# Patient Record
Sex: MALE | Race: NATIVE HAWAIIAN OR OTHER PACIFIC ISLANDER | URBAN - METROPOLITAN AREA
[De-identification: names, ages, dates, MRNs, and addresses within clinical notes are randomized per-mention and may not be internally consistent; named-entity substitution may affect disease eponyms.]

---

## 2024-09-03 NOTE — PROGRESS NOTES
General: Skin is warm.   Neurological:      General: No focal deficit present.   Psychiatric:         Mood and Affect: Mood normal.          I have reviewed all laboratory studies, reports, data, and pertinent images.    12/19/2018 [facility] MRI lumbar  Degenerative changes, notable for severe spinal canal narrowing at L2-3 and L3-4, multilevel high-grade subarticular zone encroachment, and multilevel foraminal stenosis.  Foraminal narrowing on the right is worst at L4-5 and foraminal narrowing on the left is worst at L5-S1.     7/9/2021 MRI cervical spine [facility]  Multilevel degenerative changes with resulting spinal canal narrowing   most pronounced and mild to moderate in severity at C3-C4 eccentric to   the right and at C5-C6.  Foraminal narrowing most pronounced and severe bilaterally at C5-C6.     9/12/2018 x-ray lumbar spine [facility]  Degenerative disc disease, degenerative facet changes and mild scoliosis     10/2/2018 EMG [facility]  Left median neuropathy mild  left ulnar neuropathy moderate  Left L5 mild degree changes  Possible polyneuropathy or S1 root  Not consistent with a generalized large fiber sensorimotor polyneuropathy    HISTORY OF PRESENT ILLNESS:    Dr. [facility], Dr. Crenshaw orthopedic surgery,  Progressive history gait deterioration unable to walk into a store without using a grocery cart.  Cannot go anywhere or do anything because his gait is so bad and balance issues are increasing over these last several months to years.  He is afraid he may fall.  If he is sitting or resting he has no pain.  Per his activity even walking 50 steps the pain gets so severe in his low back rating down both lower extremities left greater than right he has to stop and rest before going further.    10-12 years ago had a neck injury and since then is unable to turn his head to the right as far as he can to the left.  His arms are okay.  At night he gets numbness in the left upper extremity but not during the day.  Not a

## 2024-09-05 ENCOUNTER — OFFICE VISIT (OUTPATIENT)
Age: 79
End: 2024-09-05
Payer: MEDICARE

## 2024-09-05 ENCOUNTER — HOSPITAL ENCOUNTER (OUTPATIENT)
Dept: GENERAL RADIOLOGY | Age: 79
Discharge: HOME | End: 2024-09-07
Attending: NEUROLOGICAL SURGERY
Payer: MEDICARE

## 2024-09-05 VITALS
DIASTOLIC BLOOD PRESSURE: 74 MMHG | HEIGHT: 67 IN | WEIGHT: 215 LBS | SYSTOLIC BLOOD PRESSURE: 116 MMHG | BODY MASS INDEX: 33.74 KG/M2

## 2024-09-05 DIAGNOSIS — M48.062 LUMBAR STENOSIS WITH NEUROGENIC CLAUDICATION: ICD-10-CM

## 2024-09-05 DIAGNOSIS — M47.812 CERVICAL SPONDYLOSIS: Primary | ICD-10-CM

## 2024-09-05 PROCEDURE — 99204 OFFICE O/P NEW MOD 45 MIN: CPT | Performed by: NEUROLOGICAL SURGERY

## 2024-09-05 PROCEDURE — 72100 X-RAY EXAM L-S SPINE 2/3 VWS: CPT

## 2024-09-05 PROCEDURE — 1123F ACP DISCUSS/DSCN MKR DOCD: CPT | Performed by: NEUROLOGICAL SURGERY

## 2024-09-05 RX ORDER — ROSUVASTATIN CALCIUM 20 MG/1
20 TABLET, COATED ORAL DAILY
COMMUNITY
Start: 2024-05-03

## 2024-09-05 RX ORDER — MULTIVIT-MIN/IRON/FOLIC ACID/K 18-600-40
CAPSULE ORAL
COMMUNITY

## 2024-09-05 RX ORDER — PETROLATUM,WHITE/LANOLIN
OINTMENT (GRAM) TOPICAL
COMMUNITY

## 2024-09-05 RX ORDER — IRBESARTAN 300 MG/1
1 TABLET ORAL DAILY
COMMUNITY
Start: 2024-02-29

## 2024-09-05 RX ORDER — FUROSEMIDE 20 MG
20 TABLET ORAL
COMMUNITY
Start: 2024-08-14

## 2024-09-05 RX ORDER — AMOXICILLIN 500 MG/1
500 CAPSULE ORAL 3 TIMES DAILY
COMMUNITY

## 2024-09-05 RX ORDER — GABAPENTIN 300 MG/1
300 CAPSULE ORAL 3 TIMES DAILY
COMMUNITY
Start: 2024-08-28 | End: 2024-09-27

## 2024-09-05 RX ORDER — AMLODIPINE BESYLATE 2.5 MG/1
2.5 TABLET ORAL DAILY
COMMUNITY
Start: 2024-03-04

## 2024-09-05 ASSESSMENT — ENCOUNTER SYMPTOMS
SHORTNESS OF BREATH: 0
NAUSEA: 0
EYE PAIN: 0
CONSTIPATION: 0
BACK PAIN: 1

## 2024-09-13 ENCOUNTER — HOSPITAL ENCOUNTER (OUTPATIENT)
Dept: MRI IMAGING | Age: 79
Discharge: HOME | End: 2024-09-15
Payer: MEDICARE

## 2024-09-13 DIAGNOSIS — M48.062 LUMBAR STENOSIS WITH NEUROGENIC CLAUDICATION: ICD-10-CM

## 2024-09-13 PROCEDURE — 72148 MRI LUMBAR SPINE W/O DYE: CPT

## 2024-09-27 NOTE — PROGRESS NOTES
Patient Name: Leighton Yoon : 1945        Date: 10/8/2024      Type of Appt: Follow up    Reason for appt:  PSR BACK AND NECK PAIN. LUMBAR XRAYS DONE AT [facility] L SIDE EMG UPPER AND LOWER DONE AT [facility]. HAS HAD PILO CERVICAL INJECTIONS WITH NO RELIEF. DONE BY DR. HERNÁNDEZ AT [facility].   Pt last seen by Dr Phelps on 2024    Studies done: 24 XRAY OF LUMBAR AT [facility]  24 MRI OF LUMBAR SPINE AT [facility]    Physical therapy: YES OR NO    Smoking: No       Chief complaint: neck and back pain        Referred by No ref. provider found        PAST MEDICAL, FAMILY AND SOCIAL HISTORY:  Past Medical History   No past medical history on file.     Past Surgical History   No past surgical history on file.     Family History   No family history on file.     Medications Ordered Prior to Encounter          Current Outpatient Medications on File Prior to Visit   Medication Sig Dispense Refill    amLODIPine (NORVASC) 2.5 MG tablet Take 1 tablet by mouth daily        cyanocobalamin 100 MCG tablet Take 1 tablet by mouth daily        furosemide (LASIX) 20 MG tablet Take 1 tablet by mouth        gabapentin (NEURONTIN) 300 MG capsule Take 1 capsule by mouth 3 times daily.        irbesartan (AVAPRO) 300 MG tablet Take 1 tablet by mouth daily        metFORMIN (GLUCOPHAGE) 500 MG tablet Take 1 tablet by mouth in the morning and 1 tablet in the evening.        rosuvastatin (CRESTOR) 20 MG tablet Take 1 tablet by mouth daily        amoxicillin (AMOXIL) 500 MG capsule Take 1 capsule by mouth 3 times daily        Vitamin D, Cholecalciferol, 50 MCG (2000 UT) CAPS Take by mouth          No current facility-administered medications on file prior to visit.         Social History            ALLERGIES  Allergies   Not on File           REVIEW OF SYSTEMS:  Review of Systems   Constitutional:  Negative for fever.   HENT:  Positive for hearing loss.    Eyes:  Negative for pain.   Respiratory:  Negative for shortness of breath.

## 2024-10-08 ENCOUNTER — PREP FOR PROCEDURE (OUTPATIENT)
Dept: NEUROSURGERY | Age: 79
End: 2024-10-08

## 2024-10-08 ENCOUNTER — OFFICE VISIT (OUTPATIENT)
Age: 79
End: 2024-10-08
Payer: MEDICARE

## 2024-10-08 VITALS
TEMPERATURE: 98.6 F | BODY MASS INDEX: 34.09 KG/M2 | WEIGHT: 217.2 LBS | HEIGHT: 67 IN | SYSTOLIC BLOOD PRESSURE: 110 MMHG | DIASTOLIC BLOOD PRESSURE: 75 MMHG

## 2024-10-08 DIAGNOSIS — M48.062 LUMBAR STENOSIS WITH NEUROGENIC CLAUDICATION: Primary | ICD-10-CM

## 2024-10-08 DIAGNOSIS — E11.9 DIABETES MELLITUS TYPE II, NON INSULIN DEPENDENT (HCC): ICD-10-CM

## 2024-10-08 PROCEDURE — 1123F ACP DISCUSS/DSCN MKR DOCD: CPT | Performed by: NEUROLOGICAL SURGERY

## 2024-10-08 PROCEDURE — 99213 OFFICE O/P EST LOW 20 MIN: CPT | Performed by: NEUROLOGICAL SURGERY

## 2024-10-08 PROCEDURE — 99215 OFFICE O/P EST HI 40 MIN: CPT

## 2024-10-08 RX ORDER — ALBUTEROL SULFATE 90 UG/1
2 INHALANT RESPIRATORY (INHALATION) EVERY 4 HOURS PRN
COMMUNITY
Start: 2024-08-13

## 2024-10-08 RX ORDER — DIMENHYDRINATE 50 MG
TABLET ORAL
COMMUNITY

## 2024-10-08 RX ORDER — SODIUM CHLORIDE 0.9 % (FLUSH) 0.9 %
5-40 SYRINGE (ML) INJECTION PRN
Status: CANCELLED | OUTPATIENT
Start: 2024-10-08

## 2024-10-08 RX ORDER — BLOOD SUGAR DIAGNOSTIC
STRIP MISCELLANEOUS
COMMUNITY
Start: 2024-09-10

## 2024-10-08 RX ORDER — KETOROLAC TROMETHAMINE 30 MG/ML
15 INJECTION, SOLUTION INTRAMUSCULAR; INTRAVENOUS ONCE
Status: CANCELLED | OUTPATIENT
Start: 2024-10-08 | End: 2024-10-08

## 2024-10-08 RX ORDER — BACITRACIN 500 UNIT/G
160 OINTMENT (GRAM) TOPICAL DAILY
COMMUNITY

## 2024-10-08 RX ORDER — SODIUM CHLORIDE 9 MG/ML
INJECTION, SOLUTION INTRAVENOUS PRN
Status: CANCELLED | OUTPATIENT
Start: 2024-10-08

## 2024-10-08 RX ORDER — LANCETS
EACH MISCELLANEOUS
COMMUNITY
Start: 2024-08-13

## 2024-10-08 RX ORDER — SENNOSIDES 8.6 MG
650 CAPSULE ORAL 2 TIMES DAILY
COMMUNITY

## 2024-10-08 RX ORDER — ACETAMINOPHEN 325 MG/1
1000 TABLET ORAL ONCE
Status: CANCELLED | OUTPATIENT
Start: 2024-10-08 | End: 2024-10-08

## 2024-10-08 RX ORDER — PRAVASTATIN SODIUM 20 MG
TABLET ORAL
COMMUNITY

## 2024-10-08 RX ORDER — ALBUTEROL SULFATE 0.83 MG/ML
2.5 SOLUTION RESPIRATORY (INHALATION)
COMMUNITY

## 2024-10-08 RX ORDER — SODIUM CHLORIDE 0.9 % (FLUSH) 0.9 %
5-40 SYRINGE (ML) INJECTION EVERY 12 HOURS SCHEDULED
Status: CANCELLED | OUTPATIENT
Start: 2024-10-08

## 2024-10-16 ENCOUNTER — PREP FOR PROCEDURE (OUTPATIENT)
Age: 79
End: 2024-10-16

## 2024-10-16 DIAGNOSIS — M48.062 SPINAL STENOSIS, LUMBAR REGION, WITH NEUROGENIC CLAUDICATION: ICD-10-CM

## 2024-10-29 ENCOUNTER — HOSPITAL ENCOUNTER (OUTPATIENT)
Dept: PREADMISSION TESTING | Age: 79
Discharge: HOME | End: 2024-11-02
Payer: MEDICARE

## 2024-10-29 VITALS
TEMPERATURE: 98.4 F | HEART RATE: 93 BPM | BODY MASS INDEX: 33.28 KG/M2 | RESPIRATION RATE: 17 BRPM | WEIGHT: 219.6 LBS | SYSTOLIC BLOOD PRESSURE: 154 MMHG | OXYGEN SATURATION: 96 % | DIASTOLIC BLOOD PRESSURE: 78 MMHG | HEIGHT: 68 IN

## 2024-10-29 PROBLEM — E87.1 HYPONATREMIA: Status: RESOLVED | Noted: 2022-06-27 | Resolved: 2024-10-29

## 2024-10-29 PROBLEM — G45.9 TIA (TRANSIENT ISCHEMIC ATTACK): Status: ACTIVE | Noted: 2024-10-29

## 2024-10-29 PROBLEM — E78.00 HYPERCHOLESTEREMIA: Status: ACTIVE | Noted: 2024-10-29

## 2024-10-29 PROBLEM — E66.811 OBESITY, CLASS I, BMI 30-34.9: Status: ACTIVE | Noted: 2019-08-26

## 2024-10-29 PROBLEM — I10 HYPERTENSION: Status: ACTIVE | Noted: 2024-10-29

## 2024-10-29 PROBLEM — R22.1 NECK MASS: Status: RESOLVED | Noted: 2024-10-29 | Resolved: 2024-10-29

## 2024-10-29 PROBLEM — K57.90 DIVERTICULOSIS: Status: ACTIVE | Noted: 2024-10-29

## 2024-10-29 PROBLEM — J18.9 PNEUMONIA OF LEFT LOWER LOBE DUE TO INFECTIOUS ORGANISM: Status: RESOLVED | Noted: 2022-06-27 | Resolved: 2024-10-29

## 2024-10-29 PROBLEM — E11.40 TYPE 2 DIABETES MELLITUS WITH DIABETIC NEUROPATHY, WITHOUT LONG-TERM CURRENT USE OF INSULIN (HCC): Status: ACTIVE | Noted: 2021-09-27

## 2024-10-29 PROBLEM — R74.8 ELEVATED LIVER ENZYMES: Status: RESOLVED | Noted: 2022-06-27 | Resolved: 2024-10-29

## 2024-10-29 PROBLEM — E87.5 HYPERKALEMIA: Status: RESOLVED | Noted: 2021-12-23 | Resolved: 2024-10-29

## 2024-10-29 PROBLEM — Z86.0100 HISTORY OF COLONIC POLYPS: Status: ACTIVE | Noted: 2017-06-30

## 2024-10-29 PROBLEM — M19.90 ARTHRITIS: Status: ACTIVE | Noted: 2024-10-29

## 2024-10-29 PROBLEM — K11.8 PAROTID MASS: Status: RESOLVED | Noted: 2021-12-22 | Resolved: 2024-10-29

## 2024-10-29 LAB
ABO + RH BLD: NORMAL
ANION GAP SERPL CALCULATED.3IONS-SCNC: 13 MEQ/L (ref 9–15)
BLD GP AB SCN SERPL QL: NORMAL
BUN SERPL-MCNC: 13 MG/DL (ref 8–23)
CALCIUM SERPL-MCNC: 9 MG/DL (ref 8.5–9.9)
CHLORIDE SERPL-SCNC: 100 MEQ/L (ref 95–107)
CO2 SERPL-SCNC: 25 MEQ/L (ref 20–31)
CREAT SERPL-MCNC: 0.88 MG/DL (ref 0.7–1.2)
ERYTHROCYTE [DISTWIDTH] IN BLOOD BY AUTOMATED COUNT: 12.6 % (ref 11.5–14.5)
GLUCOSE SERPL-MCNC: 158 MG/DL (ref 70–99)
HCT VFR BLD AUTO: 44 % (ref 42–52)
HGB BLD-MCNC: 14.8 G/DL (ref 14–18)
MCH RBC QN AUTO: 33.6 PG (ref 27–31.3)
MCHC RBC AUTO-ENTMCNC: 33.6 % (ref 33–37)
MCV RBC AUTO: 100 FL (ref 79–92.2)
PLATELET # BLD AUTO: 220 K/UL (ref 130–400)
POTASSIUM SERPL-SCNC: 3.9 MEQ/L (ref 3.4–4.9)
RBC # BLD AUTO: 4.4 M/UL (ref 4.7–6.1)
SODIUM SERPL-SCNC: 138 MEQ/L (ref 135–144)
WBC # BLD AUTO: 6.5 K/UL (ref 4.8–10.8)

## 2024-10-29 PROCEDURE — 93005 ELECTROCARDIOGRAM TRACING: CPT | Performed by: FAMILY MEDICINE

## 2024-10-29 PROCEDURE — 80048 BASIC METABOLIC PNL TOTAL CA: CPT

## 2024-10-29 PROCEDURE — 83036 HEMOGLOBIN GLYCOSYLATED A1C: CPT

## 2024-10-29 PROCEDURE — 87641 MR-STAPH DNA AMP PROBE: CPT

## 2024-10-29 PROCEDURE — 86900 BLOOD TYPING SEROLOGIC ABO: CPT

## 2024-10-29 PROCEDURE — 86850 RBC ANTIBODY SCREEN: CPT

## 2024-10-29 PROCEDURE — 86901 BLOOD TYPING SEROLOGIC RH(D): CPT

## 2024-10-29 PROCEDURE — 85027 COMPLETE CBC AUTOMATED: CPT

## 2024-10-29 RX ORDER — DOCUSATE SODIUM 100 MG/1
100 CAPSULE, LIQUID FILLED ORAL 2 TIMES DAILY
COMMUNITY

## 2024-10-29 ASSESSMENT — ENCOUNTER SYMPTOMS
EYE ITCHING: 0
APNEA: 1
RHINORRHEA: 0
PHOTOPHOBIA: 0
SINUS PRESSURE: 0
ABDOMINAL PAIN: 0
COUGH: 0
EYE REDNESS: 0
VOMITING: 0
CHEST TIGHTNESS: 0
EYE DISCHARGE: 0
SORE THROAT: 0
CONSTIPATION: 0
EYE PAIN: 0
FACIAL SWELLING: 0
SINUS PAIN: 0
TROUBLE SWALLOWING: 0
WHEEZING: 0
CHOKING: 0
NAUSEA: 0
SHORTNESS OF BREATH: 0
DIARRHEA: 0
BACK PAIN: 1
ABDOMINAL DISTENTION: 0

## 2024-10-29 NOTE — H&P
PRE ADMISSION TESTING    HISTORY AND PHYSICAL EXAM    PATIENT NAME:  Leighton Yoon    YOB: 1945  MRN:  [id number]  SERVICE DATE:  10/29/2024     Primary Care Physician: Harry Nolan MD   Surgeon: Dr. Vale Phelps    SUBJECTIVE  CHIEF COMPLAINT:  Spinal stenosis, lumbar region, with neurogenic claudication     The patient is a 79 y.o. male with significant past medical history of DM II, HTN, MASSIEL, HLD, Asthma (mild intermittent), Hyperparathyroidism, hx TIA who presents for a preoperative consultation at the request of surgeon, Dr. Vale Phelps, who plans on performing Lumbar 2 3, 3 4, 4 5 microdecompressions on 11/4/24 at [facility].     Patient reports he has a long history of back pain which was caused from playing sports throughout is teen and early adult years.  The pain has been progressing and he has had previous consultations and imaging within the past few years but is now ready to move forward with treatment.  He owns a [employer] business and he has noticed that lately that he needs several days to rest his back after the event.  This has caused him to seek treatment.  Patient reports his pain level can be 8-10/10 after working and 3-4/10 on average without working in his low back region.  Pain is described as achy but can be sharp on occasions and radiates down his bilateral legs and is accompanied by numbness/tingling.  Denies loss of bowel or bladder function.  Walking and standing for long periods will increase his pain.  Sitting and resting will help relieve some of his pain.  He has had injections in the past without much relief.  He saw Dr. Phelps for a consultation and he was able to review his previous imaging and ordered new imaging.  Patient was able to have that completed and had a follow up to discuss surgery.  Dr. Phelps provided the patient options based on the updated imaging and the patient elected to proceed with the procedure listed above.        Known Anesthesia Problems:

## 2024-10-29 NOTE — H&P (VIEW-ONLY)
PRE ADMISSION TESTING    HISTORY AND PHYSICAL EXAM    PATIENT NAME:  Leighton Yoon    YOB: 1945  MRN:  [id number]  SERVICE DATE:  10/29/2024     Primary Care Physician: Harry Nolan MD   Surgeon: Dr. Vale Phelps    SUBJECTIVE  CHIEF COMPLAINT:  Spinal stenosis, lumbar region, with neurogenic claudication     The patient is a 79 y.o. male with significant past medical history of DM II, HTN, MASSIEL, HLD, Asthma (mild intermittent), Hyperparathyroidism, hx TIA who presents for a preoperative consultation at the request of surgeon, Dr. Vale Phelps, who plans on performing Lumbar 2 3, 3 4, 4 5 microdecompressions on 11/4/24 at [facility].     Patient reports he has a long history of back pain which was caused from playing sports throughout is teen and early adult years.  The pain has been progressing and he has had previous consultations and imaging within the past few years but is now ready to move forward with treatment.  He owns a [employer] business and he has noticed that lately that he needs several days to rest his back after the event.  This has caused him to seek treatment.  Patient reports his pain level can be 8-10/10 after working and 3-4/10 on average without working in his low back region.  Pain is described as achy but can be sharp on occasions and radiates down his bilateral legs and is accompanied by numbness/tingling.  Denies loss of bowel or bladder function.  Walking and standing for long periods will increase his pain.  Sitting and resting will help relieve some of his pain.  He has had injections in the past without much relief.  He saw Dr. Phelps for a consultation and he was able to review his previous imaging and ordered new imaging.  Patient was able to have that completed and had a follow up to discuss surgery.  Dr. Phelps provided the patient options based on the updated imaging and the patient elected to proceed with the procedure listed above.        Known Anesthesia Problems:

## 2024-10-30 LAB
EKG ATRIAL RATE: 76 BPM
EKG P AXIS: 63 DEGREES
EKG P-R INTERVAL: 170 MS
EKG Q-T INTERVAL: 378 MS
EKG QRS DURATION: 98 MS
EKG QTC CALCULATION (BAZETT): 425 MS
EKG R AXIS: 57 DEGREES
EKG T AXIS: 31 DEGREES
EKG VENTRICULAR RATE: 76 BPM
ESTIMATED AVERAGE GLUCOSE: 148 MG/DL
HBA1C MFR BLD: 6.8 % (ref 4–6)
MRSA, DNA, NASAL: NEGATIVE
SPECIMEN DESCRIPTION: NORMAL

## 2024-11-03 ENCOUNTER — ANESTHESIA EVENT (OUTPATIENT)
Dept: OPERATING ROOM | Age: 79
End: 2024-11-03
Payer: MEDICARE

## 2024-11-04 ENCOUNTER — APPOINTMENT (OUTPATIENT)
Dept: GENERAL RADIOLOGY | Age: 79
End: 2024-11-04
Attending: NEUROLOGICAL SURGERY
Payer: MEDICARE

## 2024-11-04 ENCOUNTER — HOSPITAL ENCOUNTER (OUTPATIENT)
Age: 79
Setting detail: OBSERVATION
Discharge: HOME | End: 2024-11-05
Attending: NEUROLOGICAL SURGERY | Admitting: NEUROLOGICAL SURGERY
Payer: MEDICARE

## 2024-11-04 ENCOUNTER — ANESTHESIA (OUTPATIENT)
Dept: OPERATING ROOM | Age: 79
End: 2024-11-04
Payer: MEDICARE

## 2024-11-04 DIAGNOSIS — M48.062 SPINAL STENOSIS, LUMBAR REGION, WITH NEUROGENIC CLAUDICATION: Primary | ICD-10-CM

## 2024-11-04 LAB
GLUCOSE BLD-MCNC: 105 MG/DL (ref 70–99)
GLUCOSE BLD-MCNC: 125 MG/DL (ref 70–99)
GLUCOSE BLD-MCNC: 135 MG/DL (ref 70–99)
GLUCOSE BLD-MCNC: 163 MG/DL (ref 70–99)
PERFORMED ON: ABNORMAL

## 2024-11-04 PROCEDURE — 2580000003 HC RX 258: Performed by: NEUROLOGICAL SURGERY

## 2024-11-04 PROCEDURE — 63048 LAM FACETEC &FORAMOT EA ADDL: CPT | Performed by: NEUROLOGICAL SURGERY

## 2024-11-04 PROCEDURE — 6370000000 HC RX 637 (ALT 250 FOR IP): Performed by: NEUROLOGICAL SURGERY

## 2024-11-04 PROCEDURE — 6360000002 HC RX W HCPCS: Performed by: NEUROLOGICAL SURGERY

## 2024-11-04 PROCEDURE — 2580000003 HC RX 258: Performed by: STUDENT IN AN ORGANIZED HEALTH CARE EDUCATION/TRAINING PROGRAM

## 2024-11-04 PROCEDURE — 2709999900 HC NON-CHARGEABLE SUPPLY: Performed by: NEUROLOGICAL SURGERY

## 2024-11-04 PROCEDURE — 94150 VITAL CAPACITY TEST: CPT

## 2024-11-04 PROCEDURE — 3600000014 HC SURGERY LEVEL 4 ADDTL 15MIN: Performed by: NEUROLOGICAL SURGERY

## 2024-11-04 PROCEDURE — 7100000001 HC PACU RECOVERY - ADDTL 15 MIN: Performed by: NEUROLOGICAL SURGERY

## 2024-11-04 PROCEDURE — 2500000003 HC RX 250 WO HCPCS: Performed by: NURSE ANESTHETIST, CERTIFIED REGISTERED

## 2024-11-04 PROCEDURE — 2500000003 HC RX 250 WO HCPCS: Performed by: NEUROLOGICAL SURGERY

## 2024-11-04 PROCEDURE — A4217 STERILE WATER/SALINE, 500 ML: HCPCS | Performed by: NEUROLOGICAL SURGERY

## 2024-11-04 PROCEDURE — 7100000000 HC PACU RECOVERY - FIRST 15 MIN: Performed by: NEUROLOGICAL SURGERY

## 2024-11-04 PROCEDURE — 3600000004 HC SURGERY LEVEL 4 BASE: Performed by: NEUROLOGICAL SURGERY

## 2024-11-04 PROCEDURE — 3700000000 HC ANESTHESIA ATTENDED CARE: Performed by: NEUROLOGICAL SURGERY

## 2024-11-04 PROCEDURE — 2720000010 HC SURG SUPPLY STERILE: Performed by: NEUROLOGICAL SURGERY

## 2024-11-04 PROCEDURE — 3700000001 HC ADD 15 MINUTES (ANESTHESIA): Performed by: NEUROLOGICAL SURGERY

## 2024-11-04 PROCEDURE — 2580000003 HC RX 258: Performed by: NURSE ANESTHETIST, CERTIFIED REGISTERED

## 2024-11-04 PROCEDURE — C1713 ANCHOR/SCREW BN/BN,TIS/BN: HCPCS | Performed by: NEUROLOGICAL SURGERY

## 2024-11-04 PROCEDURE — 63047 LAM FACETEC & FORAMOT LUMBAR: CPT | Performed by: NEUROLOGICAL SURGERY

## 2024-11-04 PROCEDURE — 6360000002 HC RX W HCPCS: Performed by: NURSE ANESTHETIST, CERTIFIED REGISTERED

## 2024-11-04 PROCEDURE — G0378 HOSPITAL OBSERVATION PER HR: HCPCS

## 2024-11-04 RX ORDER — SENNOSIDES 8.6 MG
650 CAPSULE ORAL 2 TIMES DAILY
Status: DISCONTINUED | OUTPATIENT
Start: 2024-11-04 | End: 2024-11-04 | Stop reason: ALTCHOICE

## 2024-11-04 RX ORDER — PROCHLORPERAZINE EDISYLATE 5 MG/ML
5 INJECTION INTRAMUSCULAR; INTRAVENOUS
Status: DISCONTINUED | OUTPATIENT
Start: 2024-11-04 | End: 2024-11-04 | Stop reason: HOSPADM

## 2024-11-04 RX ORDER — ALBUTEROL SULFATE 90 UG/1
2 INHALANT RESPIRATORY (INHALATION) EVERY 4 HOURS PRN
Status: DISCONTINUED | OUTPATIENT
Start: 2024-11-04 | End: 2024-11-05 | Stop reason: HOSPADM

## 2024-11-04 RX ORDER — UBIDECARENONE 75 MG
100 CAPSULE ORAL DAILY
Status: DISCONTINUED | OUTPATIENT
Start: 2024-11-04 | End: 2024-11-05 | Stop reason: HOSPADM

## 2024-11-04 RX ORDER — BISACODYL 5 MG/1
5 TABLET, DELAYED RELEASE ORAL DAILY
Status: DISCONTINUED | OUTPATIENT
Start: 2024-11-04 | End: 2024-11-05 | Stop reason: HOSPADM

## 2024-11-04 RX ORDER — SODIUM CHLORIDE, SODIUM LACTATE, POTASSIUM CHLORIDE, CALCIUM CHLORIDE 600; 310; 30; 20 MG/100ML; MG/100ML; MG/100ML; MG/100ML
INJECTION, SOLUTION INTRAVENOUS CONTINUOUS
Status: DISCONTINUED | OUTPATIENT
Start: 2024-11-04 | End: 2024-11-04 | Stop reason: HOSPADM

## 2024-11-04 RX ORDER — PROPOFOL 10 MG/ML
INJECTION, EMULSION INTRAVENOUS
Status: DISCONTINUED | OUTPATIENT
Start: 2024-11-04 | End: 2024-11-04 | Stop reason: SDUPTHER

## 2024-11-04 RX ORDER — SODIUM CHLORIDE 0.9 % (FLUSH) 0.9 %
5-40 SYRINGE (ML) INJECTION PRN
Status: DISCONTINUED | OUTPATIENT
Start: 2024-11-04 | End: 2024-11-04 | Stop reason: HOSPADM

## 2024-11-04 RX ORDER — OXYCODONE HYDROCHLORIDE 5 MG/1
5 TABLET ORAL EVERY 6 HOURS PRN
Status: DISCONTINUED | OUTPATIENT
Start: 2024-11-04 | End: 2024-11-05 | Stop reason: HOSPADM

## 2024-11-04 RX ORDER — FENTANYL CITRATE 0.05 MG/ML
50 INJECTION, SOLUTION INTRAMUSCULAR; INTRAVENOUS EVERY 5 MIN PRN
Status: DISCONTINUED | OUTPATIENT
Start: 2024-11-04 | End: 2024-11-04 | Stop reason: HOSPADM

## 2024-11-04 RX ORDER — ACETAMINOPHEN 325 MG/1
650 TABLET ORAL EVERY 6 HOURS
Status: DISCONTINUED | OUTPATIENT
Start: 2024-11-04 | End: 2024-11-05 | Stop reason: HOSPADM

## 2024-11-04 RX ORDER — BACITRACIN 500 UNIT/G
160 OINTMENT (GRAM) TOPICAL DAILY
Status: DISCONTINUED | OUTPATIENT
Start: 2024-11-04 | End: 2024-11-04 | Stop reason: CLARIF

## 2024-11-04 RX ORDER — LIDOCAINE HYDROCHLORIDE 20 MG/ML
INJECTION, SOLUTION INTRAVENOUS
Status: DISCONTINUED | OUTPATIENT
Start: 2024-11-04 | End: 2024-11-04 | Stop reason: SDUPTHER

## 2024-11-04 RX ORDER — MEPERIDINE HYDROCHLORIDE 25 MG/ML
12.5 INJECTION INTRAMUSCULAR; INTRAVENOUS; SUBCUTANEOUS EVERY 5 MIN PRN
Status: DISCONTINUED | OUTPATIENT
Start: 2024-11-04 | End: 2024-11-04 | Stop reason: HOSPADM

## 2024-11-04 RX ORDER — SODIUM CHLORIDE 9 MG/ML
INJECTION, SOLUTION INTRAVENOUS PRN
Status: DISCONTINUED | OUTPATIENT
Start: 2024-11-04 | End: 2024-11-05 | Stop reason: HOSPADM

## 2024-11-04 RX ORDER — POLYETHYLENE GLYCOL 3350 17 G/17G
17 POWDER, FOR SOLUTION ORAL DAILY PRN
Status: DISCONTINUED | OUTPATIENT
Start: 2024-11-04 | End: 2024-11-05 | Stop reason: HOSPADM

## 2024-11-04 RX ORDER — M-VIT,TX,IRON,MINS/CALC/FOLIC 27MG-0.4MG
1 TABLET ORAL DAILY
Status: DISCONTINUED | OUTPATIENT
Start: 2024-11-04 | End: 2024-11-05 | Stop reason: HOSPADM

## 2024-11-04 RX ORDER — OXYCODONE HYDROCHLORIDE 5 MG/1
5 TABLET ORAL EVERY 4 HOURS PRN
Status: DISCONTINUED | OUTPATIENT
Start: 2024-11-04 | End: 2024-11-05 | Stop reason: HOSPADM

## 2024-11-04 RX ORDER — HYDRALAZINE HYDROCHLORIDE 20 MG/ML
10 INJECTION INTRAMUSCULAR; INTRAVENOUS
Status: DISCONTINUED | OUTPATIENT
Start: 2024-11-04 | End: 2024-11-04 | Stop reason: HOSPADM

## 2024-11-04 RX ORDER — MAGNESIUM HYDROXIDE 1200 MG/15ML
LIQUID ORAL CONTINUOUS PRN
Status: DISCONTINUED | OUTPATIENT
Start: 2024-11-04 | End: 2024-11-04 | Stop reason: HOSPADM

## 2024-11-04 RX ORDER — ALBUTEROL SULFATE 0.83 MG/ML
2.5 SOLUTION RESPIRATORY (INHALATION) EVERY 4 HOURS PRN
Status: DISCONTINUED | OUTPATIENT
Start: 2024-11-04 | End: 2024-11-05 | Stop reason: HOSPADM

## 2024-11-04 RX ORDER — ROCURONIUM BROMIDE 10 MG/ML
INJECTION, SOLUTION INTRAVENOUS
Status: DISCONTINUED | OUTPATIENT
Start: 2024-11-04 | End: 2024-11-04 | Stop reason: SDUPTHER

## 2024-11-04 RX ORDER — VITAMIN B COMPLEX
2000 TABLET ORAL DAILY
Status: DISCONTINUED | OUTPATIENT
Start: 2024-11-04 | End: 2024-11-05 | Stop reason: HOSPADM

## 2024-11-04 RX ORDER — HYDROCODONE BITARTRATE AND ACETAMINOPHEN 5; 325 MG/1; MG/1
1 TABLET ORAL EVERY 6 HOURS PRN
Qty: 28 TABLET | Refills: 0 | Status: SHIPPED | OUTPATIENT
Start: 2024-11-04 | End: 2024-11-11

## 2024-11-04 RX ORDER — SODIUM CHLORIDE 9 MG/ML
INJECTION, SOLUTION INTRAVENOUS PRN
Status: DISCONTINUED | OUTPATIENT
Start: 2024-11-04 | End: 2024-11-04 | Stop reason: HOSPADM

## 2024-11-04 RX ORDER — KETOROLAC TROMETHAMINE 15 MG/ML
15 INJECTION, SOLUTION INTRAMUSCULAR; INTRAVENOUS ONCE
Status: COMPLETED | OUTPATIENT
Start: 2024-11-04 | End: 2024-11-04

## 2024-11-04 RX ORDER — OXYCODONE HYDROCHLORIDE 5 MG/1
5 TABLET ORAL PRN
Status: DISCONTINUED | OUTPATIENT
Start: 2024-11-04 | End: 2024-11-04 | Stop reason: HOSPADM

## 2024-11-04 RX ORDER — NALOXONE HYDROCHLORIDE 0.4 MG/ML
INJECTION, SOLUTION INTRAMUSCULAR; INTRAVENOUS; SUBCUTANEOUS PRN
Status: DISCONTINUED | OUTPATIENT
Start: 2024-11-04 | End: 2024-11-04 | Stop reason: HOSPADM

## 2024-11-04 RX ORDER — LOSARTAN POTASSIUM 25 MG/1
50 TABLET ORAL DAILY
Status: DISCONTINUED | OUTPATIENT
Start: 2024-11-04 | End: 2024-11-05 | Stop reason: HOSPADM

## 2024-11-04 RX ORDER — PETROLATUM,WHITE/LANOLIN
1 OINTMENT (GRAM) TOPICAL DAILY
Status: DISCONTINUED | OUTPATIENT
Start: 2024-11-04 | End: 2024-11-04 | Stop reason: CLARIF

## 2024-11-04 RX ORDER — DOCUSATE SODIUM 100 MG/1
100 CAPSULE, LIQUID FILLED ORAL 2 TIMES DAILY
Status: DISCONTINUED | OUTPATIENT
Start: 2024-11-04 | End: 2024-11-05 | Stop reason: HOSPADM

## 2024-11-04 RX ORDER — SODIUM CHLORIDE, SODIUM LACTATE, POTASSIUM CHLORIDE, CALCIUM CHLORIDE 600; 310; 30; 20 MG/100ML; MG/100ML; MG/100ML; MG/100ML
INJECTION, SOLUTION INTRAVENOUS
Status: DISCONTINUED | OUTPATIENT
Start: 2024-11-04 | End: 2024-11-04 | Stop reason: SDUPTHER

## 2024-11-04 RX ORDER — PRAVASTATIN SODIUM 10 MG
20 TABLET ORAL NIGHTLY
Status: DISCONTINUED | OUTPATIENT
Start: 2024-11-04 | End: 2024-11-05 | Stop reason: HOSPADM

## 2024-11-04 RX ORDER — SODIUM CHLORIDE 0.9 % (FLUSH) 0.9 %
5-40 SYRINGE (ML) INJECTION EVERY 12 HOURS SCHEDULED
Status: DISCONTINUED | OUTPATIENT
Start: 2024-11-04 | End: 2024-11-04 | Stop reason: HOSPADM

## 2024-11-04 RX ORDER — DIMENHYDRINATE 50 MG
1 TABLET ORAL DAILY
Status: DISCONTINUED | OUTPATIENT
Start: 2024-11-04 | End: 2024-11-04 | Stop reason: CLARIF

## 2024-11-04 RX ORDER — GABAPENTIN 300 MG/1
300 CAPSULE ORAL 3 TIMES DAILY
Status: DISCONTINUED | OUTPATIENT
Start: 2024-11-04 | End: 2024-11-05 | Stop reason: HOSPADM

## 2024-11-04 RX ORDER — DIPHENHYDRAMINE HYDROCHLORIDE 50 MG/ML
12.5 INJECTION INTRAMUSCULAR; INTRAVENOUS
Status: DISCONTINUED | OUTPATIENT
Start: 2024-11-04 | End: 2024-11-04 | Stop reason: HOSPADM

## 2024-11-04 RX ORDER — ONDANSETRON 2 MG/ML
4 INJECTION INTRAMUSCULAR; INTRAVENOUS
Status: DISCONTINUED | OUTPATIENT
Start: 2024-11-04 | End: 2024-11-04 | Stop reason: HOSPADM

## 2024-11-04 RX ORDER — VANCOMYCIN HYDROCHLORIDE 1 G/20ML
INJECTION, POWDER, LYOPHILIZED, FOR SOLUTION INTRAVENOUS PRN
Status: DISCONTINUED | OUTPATIENT
Start: 2024-11-04 | End: 2024-11-04 | Stop reason: HOSPADM

## 2024-11-04 RX ORDER — AMLODIPINE BESYLATE 2.5 MG/1
2.5 TABLET ORAL DAILY
Status: DISCONTINUED | OUTPATIENT
Start: 2024-11-04 | End: 2024-11-05 | Stop reason: HOSPADM

## 2024-11-04 RX ORDER — ONDANSETRON 2 MG/ML
4 INJECTION INTRAMUSCULAR; INTRAVENOUS EVERY 6 HOURS PRN
Status: DISCONTINUED | OUTPATIENT
Start: 2024-11-04 | End: 2024-11-05 | Stop reason: HOSPADM

## 2024-11-04 RX ORDER — SODIUM CHLORIDE 0.9 % (FLUSH) 0.9 %
5-40 SYRINGE (ML) INJECTION EVERY 12 HOURS SCHEDULED
Status: DISCONTINUED | OUTPATIENT
Start: 2024-11-04 | End: 2024-11-05 | Stop reason: HOSPADM

## 2024-11-04 RX ORDER — FENTANYL CITRATE 0.05 MG/ML
25 INJECTION, SOLUTION INTRAMUSCULAR; INTRAVENOUS EVERY 5 MIN PRN
Status: DISCONTINUED | OUTPATIENT
Start: 2024-11-04 | End: 2024-11-04 | Stop reason: HOSPADM

## 2024-11-04 RX ORDER — OXYCODONE HYDROCHLORIDE 5 MG/1
10 TABLET ORAL PRN
Status: DISCONTINUED | OUTPATIENT
Start: 2024-11-04 | End: 2024-11-04 | Stop reason: HOSPADM

## 2024-11-04 RX ORDER — ACETAMINOPHEN 500 MG
1000 TABLET ORAL ONCE
Status: COMPLETED | OUTPATIENT
Start: 2024-11-04 | End: 2024-11-04

## 2024-11-04 RX ORDER — LIDOCAINE HYDROCHLORIDE AND EPINEPHRINE 10; 10 MG/ML; UG/ML
INJECTION, SOLUTION INFILTRATION; PERINEURAL PRN
Status: DISCONTINUED | OUTPATIENT
Start: 2024-11-04 | End: 2024-11-04 | Stop reason: HOSPADM

## 2024-11-04 RX ORDER — LABETALOL HYDROCHLORIDE 5 MG/ML
10 INJECTION, SOLUTION INTRAVENOUS
Status: DISCONTINUED | OUTPATIENT
Start: 2024-11-04 | End: 2024-11-04 | Stop reason: HOSPADM

## 2024-11-04 RX ORDER — FENTANYL CITRATE 50 UG/ML
INJECTION, SOLUTION INTRAMUSCULAR; INTRAVENOUS
Status: DISCONTINUED | OUTPATIENT
Start: 2024-11-04 | End: 2024-11-04 | Stop reason: SDUPTHER

## 2024-11-04 RX ORDER — SODIUM CHLORIDE 0.9 % (FLUSH) 0.9 %
5-40 SYRINGE (ML) INJECTION PRN
Status: DISCONTINUED | OUTPATIENT
Start: 2024-11-04 | End: 2024-11-05 | Stop reason: HOSPADM

## 2024-11-04 RX ORDER — DEXAMETHASONE SODIUM PHOSPHATE 10 MG/ML
INJECTION INTRAMUSCULAR; INTRAVENOUS
Status: DISCONTINUED | OUTPATIENT
Start: 2024-11-04 | End: 2024-11-04 | Stop reason: SDUPTHER

## 2024-11-04 RX ORDER — LIDOCAINE HYDROCHLORIDE 10 MG/ML
1 INJECTION, SOLUTION EPIDURAL; INFILTRATION; INTRACAUDAL; PERINEURAL
Status: DISCONTINUED | OUTPATIENT
Start: 2024-11-04 | End: 2024-11-04 | Stop reason: HOSPADM

## 2024-11-04 RX ORDER — FUROSEMIDE 20 MG/1
20 TABLET ORAL
Status: DISCONTINUED | OUTPATIENT
Start: 2024-11-04 | End: 2024-11-05 | Stop reason: HOSPADM

## 2024-11-04 RX ORDER — HYDROCODONE BITARTRATE AND ACETAMINOPHEN 5; 325 MG/1; MG/1
1 TABLET ORAL EVERY 6 HOURS PRN
Status: DISCONTINUED | OUTPATIENT
Start: 2024-11-04 | End: 2024-11-05 | Stop reason: HOSPADM

## 2024-11-04 RX ORDER — SODIUM CHLORIDE 9 MG/ML
INJECTION, SOLUTION INTRAVENOUS CONTINUOUS
Status: DISCONTINUED | OUTPATIENT
Start: 2024-11-04 | End: 2024-11-05 | Stop reason: HOSPADM

## 2024-11-04 RX ADMIN — CEFAZOLIN 2000 MG: 2 INJECTION, POWDER, FOR SOLUTION INTRAMUSCULAR; INTRAVENOUS at 07:53

## 2024-11-04 RX ADMIN — BISACODYL 5 MG: 5 TABLET, COATED ORAL at 14:33

## 2024-11-04 RX ADMIN — HYDROCODONE BITARTRATE AND ACETAMINOPHEN 1 TABLET: 5; 325 TABLET ORAL at 22:58

## 2024-11-04 RX ADMIN — ROCURONIUM BROMIDE 20 MG: 10 INJECTION, SOLUTION INTRAVENOUS at 08:51

## 2024-11-04 RX ADMIN — CEFAZOLIN 2000 MG: 2 INJECTION, POWDER, FOR SOLUTION INTRAMUSCULAR; INTRAVENOUS at 15:22

## 2024-11-04 RX ADMIN — SODIUM CHLORIDE, POTASSIUM CHLORIDE, SODIUM LACTATE AND CALCIUM CHLORIDE: 600; 310; 30; 20 INJECTION, SOLUTION INTRAVENOUS at 07:29

## 2024-11-04 RX ADMIN — GABAPENTIN 300 MG: 300 CAPSULE ORAL at 20:29

## 2024-11-04 RX ADMIN — GABAPENTIN 300 MG: 300 CAPSULE ORAL at 14:33

## 2024-11-04 RX ADMIN — CEFAZOLIN 2000 MG: 2 INJECTION, POWDER, FOR SOLUTION INTRAMUSCULAR; INTRAVENOUS at 23:02

## 2024-11-04 RX ADMIN — ROCURONIUM BROMIDE 10 MG: 10 INJECTION, SOLUTION INTRAVENOUS at 10:34

## 2024-11-04 RX ADMIN — HYDROCODONE BITARTRATE AND ACETAMINOPHEN 1 TABLET: 5; 325 TABLET ORAL at 16:44

## 2024-11-04 RX ADMIN — SODIUM CHLORIDE, POTASSIUM CHLORIDE, SODIUM LACTATE AND CALCIUM CHLORIDE 1000 ML: 600; 310; 30; 20 INJECTION, SOLUTION INTRAVENOUS at 11:57

## 2024-11-04 RX ADMIN — Medication 10 ML: at 20:30

## 2024-11-04 RX ADMIN — ROCURONIUM BROMIDE 60 MG: 10 INJECTION, SOLUTION INTRAVENOUS at 07:34

## 2024-11-04 RX ADMIN — PHENYLEPHRINE HYDROCHLORIDE 33.3 MCG/MIN: 10 INJECTION INTRAVENOUS at 08:45

## 2024-11-04 RX ADMIN — PROPOFOL 200 MG: 10 INJECTION, EMULSION INTRAVENOUS at 07:34

## 2024-11-04 RX ADMIN — SUGAMMADEX 200 MG: 100 INJECTION, SOLUTION INTRAVENOUS at 11:25

## 2024-11-04 RX ADMIN — DOCUSATE SODIUM 100 MG: 100 CAPSULE, LIQUID FILLED ORAL at 20:29

## 2024-11-04 RX ADMIN — Medication 0.2 MG: at 08:51

## 2024-11-04 RX ADMIN — Medication 0.1 MG: at 07:47

## 2024-11-04 RX ADMIN — Medication 0.1 MG: at 08:40

## 2024-11-04 RX ADMIN — ACETAMINOPHEN 1000 MG: 500 TABLET ORAL at 07:16

## 2024-11-04 RX ADMIN — LOSARTAN POTASSIUM 50 MG: 25 TABLET, FILM COATED ORAL at 14:33

## 2024-11-04 RX ADMIN — FENTANYL CITRATE 50 MCG: 50 INJECTION, SOLUTION INTRAMUSCULAR; INTRAVENOUS at 07:34

## 2024-11-04 RX ADMIN — AMLODIPINE BESYLATE 2.5 MG: 2.5 TABLET ORAL at 14:33

## 2024-11-04 RX ADMIN — FENTANYL CITRATE 50 MCG: 50 INJECTION, SOLUTION INTRAMUSCULAR; INTRAVENOUS at 09:49

## 2024-11-04 RX ADMIN — Medication 0.1 MG: at 07:59

## 2024-11-04 RX ADMIN — Medication 0.1 MG: at 08:31

## 2024-11-04 RX ADMIN — ROCURONIUM BROMIDE 20 MG: 10 INJECTION, SOLUTION INTRAVENOUS at 08:07

## 2024-11-04 RX ADMIN — PRAVASTATIN SODIUM 20 MG: 10 TABLET ORAL at 20:29

## 2024-11-04 RX ADMIN — LIDOCAINE HYDROCHLORIDE 80 MG: 20 INJECTION, SOLUTION INTRAVENOUS at 07:34

## 2024-11-04 RX ADMIN — SODIUM CHLORIDE: 9 INJECTION, SOLUTION INTRAVENOUS at 14:37

## 2024-11-04 RX ADMIN — DOCUSATE SODIUM 100 MG: 100 CAPSULE, LIQUID FILLED ORAL at 14:33

## 2024-11-04 RX ADMIN — Medication 0.1 MG: at 08:20

## 2024-11-04 RX ADMIN — ACETAMINOPHEN 325MG 650 MG: 325 TABLET ORAL at 14:33

## 2024-11-04 RX ADMIN — METFORMIN HYDROCHLORIDE 500 MG: 500 TABLET ORAL at 14:33

## 2024-11-04 RX ADMIN — DEXAMETHASONE SODIUM PHOSPHATE 10 MG: 10 INJECTION INTRAMUSCULAR; INTRAVENOUS at 07:50

## 2024-11-04 RX ADMIN — ROCURONIUM BROMIDE 20 MG: 10 INJECTION, SOLUTION INTRAVENOUS at 09:46

## 2024-11-04 RX ADMIN — KETOROLAC TROMETHAMINE 15 MG: 15 INJECTION, SOLUTION INTRAMUSCULAR; INTRAVENOUS at 07:17

## 2024-11-04 ASSESSMENT — PAIN DESCRIPTION - DESCRIPTORS
DESCRIPTORS: ACHING
DESCRIPTORS: ACHING
DESCRIPTORS: DISCOMFORT
DESCRIPTORS: ACHING

## 2024-11-04 ASSESSMENT — PAIN SCALES - GENERAL
PAINLEVEL_OUTOF10: 0
PAINLEVEL_OUTOF10: 0
PAINLEVEL_OUTOF10: 4
PAINLEVEL_OUTOF10: 5
PAINLEVEL_OUTOF10: 2
PAINLEVEL_OUTOF10: 5
PAINLEVEL_OUTOF10: 0
PAINLEVEL_OUTOF10: 5

## 2024-11-04 ASSESSMENT — PAIN DESCRIPTION - LOCATION
LOCATION: BACK

## 2024-11-04 NOTE — OP NOTE
[facility]                   [address] [place] [address]                            OPERATIVE REPORT      PATIENT NAME: LATRICE RETANA                  : 1945  MED REC NO: [id number]                        ROOM: W286  ACCOUNT NO: [id number]                       ADMIT DATE: 2024  PROVIDER: Vale Phelps MD      DATE OF PROCEDURE:  2024    SURGEON:  Vale Phelps MD    PREOPERATIVE DIAGNOSIS:  Lumbar 2-3, 3-4, 4-5 canal stenosis with neurogenic claudication.    POSTOPERATIVE DIAGNOSIS:  Lumbar 2-3, 3-4, 4-5 canal stenosis with neurogenic claudication.    OPERATION PERFORMED:  Left and bilateral L2-3, L3-4, L4-5 microdecompression.    DESCRIPTION OF PROCEDURE:  The patient was given general endotracheal anesthesia in a supine position.  Ancef IV preoperatively.  The patient was turned to the prone position.  The back was prepped and draped.  Time-out, patient identified.  Needle was placed, lateral x-rays obtained to confirm level.  Needle was withdrawn.  Skin incision was made over the tips of spinous processes of L2, 3, 4, 5.  Dissection was carried down to the spinous process tips.  On the left side only, the spinous processes, lamina, and medial facet joints at L2, 3, 4, 5 were exposed.  Needles were placed.  Lateral x-rays were obtained to confirm level.  Needles were withdrawn.  Micro retractor was placed at L4-5.  With very careful microdissection, partial hemilaminectomy, inferior aspect of L4 was performed, performing a small medial facetectomy, detaching the hypertrophied ligamentum flavum from the superior aspect of L5.  Microscope angled over the dorsal aspect of the dural sac to the patient's right side, performing a partial hemilaminectomy, inferior aspect of L4, superior aspect of L5, medial facetectomy, removing the hypertrophied ligamentum flavum.  Microscope and retractor placed superiorly at L3-4 on the left side.  Partial hemilaminectomy, inferior

## 2024-11-04 NOTE — PROGRESS NOTES
11/04/24 1400   RT Protocol   History Pulmonary Disease 2   Respiratory pattern 0   Breath sounds 0   Cough 0   Indications for Bronchodilator Therapy On home bronchodilators   Bronchodilator Assessment Score 2

## 2024-11-04 NOTE — DISCHARGE INSTRUCTIONS
Every day after surgery change dressing frequently to keep wound clean and dry using 4X4 gauze pads and paper tape.    May shower in 3 days postoperative.    Do not soak or soap wound for one week post operative.    Discharge prescriptions e-prescribed to pharmacy.  Order done  as outpatient.  [facility] 5/325 [address] [facility] pharmacy  Recheck one month.    Questions call office .

## 2024-11-04 NOTE — ANESTHESIA POSTPROCEDURE EVALUATION
Department of Anesthesiology  Postprocedure Note    Patient: Leighton Yoon  MRN: [id number]  YOB: 1945  Date of evaluation: 11/4/2024    Procedure Summary       Date: 11/04/24 Room / Location: [address]    Anesthesia Start: 0729 Anesthesia Stop: 1136    Procedure: LUMBAR 2 3, 3 4, 4 5 MICRODECOMPRESSIONS (Spine Lumbar) Diagnosis:       Spinal stenosis, lumbar region, with neurogenic claudication      (Spinal stenosis, lumbar region, with neurogenic claudication [M48.062])    Surgeons: Vale Phelps MD Responsible Provider: Phu Hurley MD    Anesthesia Type: general ASA Status: 3            Anesthesia Type: No value filed.    Simone Phase I: Simone Score: 9    Simone Phase II:      Anesthesia Post Evaluation    Patient location during evaluation: PACU  Patient participation: complete - patient participated  Level of consciousness: awake  Pain score: 0  Airway patency: patent  Nausea & Vomiting: no nausea and no vomiting  Cardiovascular status: blood pressure returned to baseline and hemodynamically stable  Respiratory status: acceptable and face mask  Hydration status: euvolemic  Multimodal analgesia pain management approach  Pain management: adequate        No notable events documented.

## 2024-11-04 NOTE — ANESTHESIA PRE PROCEDURE
opening: > = 3 FB   Dental: normal exam         Pulmonary:Negative Pulmonary ROS and normal exam    (+)     sleep apnea:       asthma:                            Cardiovascular:Negative CV ROS  Exercise tolerance: good (>4 METS)  (+) hypertension:      ECG reviewed               Beta Blocker:  Not on Beta Blocker         Neuro/Psych:   Negative Neuro/Psych ROS  (+) CVA:, TIA            GI/Hepatic/Renal: Neg GI/Hepatic/Renal ROS  (+) morbid obesity         ROS comment: Bmi 33.   Endo/Other: Negative Endo/Other ROS   (+) DiabetesType II DM.          Pt had PAT visit.       Abdominal:             Vascular: negative vascular ROS.         Other Findings:             Anesthesia Plan      general     ASA 3     (ETT)  Induction: intravenous.    MIPS: Postoperative opioids intended and Prophylactic antiemetics administered.  Anesthetic plan and risks discussed with patient.      Plan discussed with CRNA and CAA.    Attending anesthesiologist reviewed and agrees with Pre Eval content                Phu Hurley MD   11/4/2024

## 2024-11-04 NOTE — BRIEF OP NOTE
Brief Postoperative Note      Patient: Leighton Yoon  YOB: 1945  MRN: [id number]    Date of Procedure: 11/4/2024    Pre-Op Diagnosis Codes:      * Spinal stenosis, lumbar region, with neurogenic claudication [M48.062]    Post-Op Diagnosis: Same       Procedure(s):  LUMBAR 2 3, 3 4, 4 5 MICRODECOMPRESSIONS    Surgeon(s):  Vale Phelps MD    Assistant:  Physician Assistant: Vale Joy PA-C    Anesthesia: General    Estimated Blood Loss (mL): 200     Complications: None        Drains:   Closed/Suction Drain Inferior;Medial Back Accordion (Active)       Findings:  Infection Present At Time Of Surgery (PATOS) (choose all levels that have infection present):  No infection present  Other Findings: stenosis    Electronically signed by VALE PHELPS MD on 11/4/2024 at 10:58 AM

## 2024-11-04 NOTE — DISCHARGE INSTR - COC
Respiratory Treatments: ***  Oxygen Therapy:  {Therapy; copd oxygen:43904}  Ventilator:    {[facility] Vent List:427444682}    Rehab Therapies: {THERAPEUTIC INTERVENTION:5752957257}  Weight Bearing Status/Restrictions: { CC Weight Bearin}  Other Medical Equipment (for information only, NOT a DME order):  {EQUIPMENT:035744636}  Other Treatments: ***    Patient's personal belongings (please select all that are sent with patient):  {CHP DME Belongings:857908557}    RN SIGNATURE:  {Esignature:748756245}    CASE MANAGEMENT/SOCIAL WORK SECTION    Inpatient Status Date: ***    Readmission Risk Assessment Score:  Readmission Risk              Risk of Unplanned Readmission:  0           Discharging to Facility/ Agency   Name:   Address:  Phone:  Fax:    Dialysis Facility (if applicable)   Name:  Address:  Dialysis Schedule:  Phone:  Fax:    / signature: {Esignature:151755270}    PHYSICIAN SECTION    Prognosis: {Prognosis:4958399804}    Condition at Discharge: { Patient Condition:297786699}    Rehab Potential (if transferring to Rehab): {Prognosis:8548909714}    Recommended Labs or Other Treatments After Discharge: ***    Physician Certification: I certify the above information and transfer of Leighton Yoon  is necessary for the continuing treatment of the diagnosis listed and that he requires {Admit to Appropriate Level of Care:20586} for {GREATER/LESS:602356243} 30 days.     Update Admission H&P: {CHP DME Changes in HandP:656714804}    PHYSICIAN SIGNATURE:  Electronically signed by DELORES PAULA MD on 24 at 7:24 AM EST

## 2024-11-04 NOTE — CARE COORDINATION
Case Management Assessment  Initial Evaluation    Date/Time of Evaluation: 11/4/2024 4:40 PM  Assessment Completed by: Myra Rodarte RN    If patient is discharged prior to next notation, then this note serves as note for discharge by case management.    Patient Name: Leighton Yoon                   YOB: 1945  Diagnosis: Spinal stenosis, lumbar region, with neurogenic claudication [M48.062]  Spinal stenosis of lumbar region with neurogenic claudication [M48.062]                   Date / Time: 11/4/2024  5:42 AM    Patient Admission Status: Observation   Readmission Risk (Low < 19, Mod (19-27), High > 27): No data recorded  Current PCP: Harry Nolan MD  PCP verified by CM? Yes    Chart Reviewed: Yes      History Provided by: Patient  Patient Orientation: Alert and Oriented, Person, Place, Situation, Self    Patient Cognition: Alert    Hospitalization in the last 30 days (Readmission):  No    If yes, Readmission Assessment in CM Navigator will be completed.    Advance Directives:      Code Status: Full Code   Patient's Primary Decision Maker is: Legal Next of Kin      Discharge Planning:    Patient lives with: Spouse/Significant Other Type of Home: House  Primary Care Giver: Self  Patient Support Systems include: Spouse/Significant Other   Current Financial resources:    Current community resources:    Current services prior to admission: None            Current DME:              Type of Home Care services:  OT, PT    ADLS  Prior functional level: Independent in ADLs/IADLs  Current functional level: Independent in ADLs/IADLs    PT AM-PAC:   /24  OT AM-PAC:   /24    Family can provide assistance at DC: Yes  Would you like Case Management to discuss the discharge plan with any other family members/significant others, and if so, who? Yes (PT SPOUSE AS NEEDED)  Plans to Return to Present Housing: Yes  Other Identified Issues/Barriers to RETURNING to current housing: NO  Potential Assistance needed at

## 2024-11-04 NOTE — INTERVAL H&P NOTE
Update History & Physical    The patient's History and Physical of  was reviewed with the patient and I examined the patient. There was no change. The surgical site was confirmed by the patient and me.     Plan: The risks, benefits, expected outcome, and alternative to the recommended procedure have been discussed with the patient. Patient understands and wants to proceed with the procedure.     Electronically signed by DELORES PAULA MD on 11/4/2024 at 7:21 AM

## 2024-11-04 NOTE — PROGRESS NOTES
Herbal and Nutritional Product Restrictions      The following herbal, alternative, and/or nutritional/dietary supplement product(s) has been discontinued per [facility] approved policy:    Glucosamine/Chondroitin tabs  Acai Caps  Flax Seed Oil Caps  Saw Palmetto Caps    Please reorder upon discharge if appropriate.    Thank you,  Subhash Craft [facility]  11/4/2024 1:53 PM

## 2024-11-04 NOTE — PROGRESS NOTES
Spiritual Health History and Assessment/Progress Note  [facility]    Interdisciplinary rounds,  ,  ,      Name: Leighton Yoon MRN: [id number]    Age: 79 y.o.     Sex: male   Language: English   Adventist: Jain   Spinal stenosis, lumbar region, with neurogenic claudication     Date: 11/4/2024            Total Time Calculated: 15 min              Spiritual Assessment began in [facility] ORTHO TELE        Referral/Consult From: Rounding   Encounter Overview/Reason: Interdisciplinary rounds  Service Provided For: Patient    Patient awake in bed. Patient is glad he had surgery and optimistic about being able to heal. Patient is has strong alma delia and has plenty of support between family and alma delia community.       Alma Delia, Belief, Meaning:   Patient is connected with a alma delia tradition or spiritual practice and has beliefs or practices that help with coping during difficult times  Family/Friends No family/friends present      Importance and Influence:  Patient has spiritual/personal beliefs that influence decisions regarding their health  Family/Friends No family/friends present    Community:  Patient feels well-supported. Support system includes: Spouse/Partner, Children, Alma Delia Community, Friends, and Extended family  Family/Friends No family/friends present    Assessment and Plan of Care:     Patient Interventions include: Facilitated expression of thoughts and feelings, Engaged in theological reflection, and Affirmed coping skills/support systems  Family/Friends Interventions include: No family/friends present    Patient Plan of Care: Spiritual Care available upon further referral  Family/Friends Plan of Care: No family/friends present    Electronically signed by Sally Portillolain Intern on 11/4/2024 at 3:22 PM

## 2024-11-05 ENCOUNTER — CLINICAL DOCUMENTATION (OUTPATIENT)
Dept: ONCOLOGY | Age: 79
End: 2024-11-05

## 2024-11-05 VITALS
SYSTOLIC BLOOD PRESSURE: 109 MMHG | DIASTOLIC BLOOD PRESSURE: 73 MMHG | HEIGHT: 68 IN | HEART RATE: 79 BPM | OXYGEN SATURATION: 98 % | TEMPERATURE: 98.2 F | RESPIRATION RATE: 18 BRPM | BODY MASS INDEX: 33.28 KG/M2 | WEIGHT: 219.6 LBS

## 2024-11-05 LAB
GLUCOSE BLD-MCNC: 127 MG/DL (ref 70–99)
GLUCOSE BLD-MCNC: 128 MG/DL (ref 70–99)
PERFORMED ON: ABNORMAL
PERFORMED ON: ABNORMAL

## 2024-11-05 PROCEDURE — 97161 PT EVAL LOW COMPLEX 20 MIN: CPT

## 2024-11-05 PROCEDURE — G0378 HOSPITAL OBSERVATION PER HR: HCPCS

## 2024-11-05 PROCEDURE — 6370000000 HC RX 637 (ALT 250 FOR IP): Performed by: NEUROLOGICAL SURGERY

## 2024-11-05 PROCEDURE — 2580000003 HC RX 258: Performed by: NEUROLOGICAL SURGERY

## 2024-11-05 PROCEDURE — 99024 POSTOP FOLLOW-UP VISIT: CPT

## 2024-11-05 RX ORDER — ROSUVASTATIN CALCIUM 20 MG/1
20 TABLET, COATED ORAL DAILY
Qty: 30 TABLET | Refills: 3 | Status: SHIPPED | OUTPATIENT
Start: 2024-11-05

## 2024-11-05 RX ADMIN — Medication 10 ML: at 09:38

## 2024-11-05 RX ADMIN — HYDROCODONE BITARTRATE AND ACETAMINOPHEN 1 TABLET: 5; 325 TABLET ORAL at 05:03

## 2024-11-05 RX ADMIN — BISACODYL 5 MG: 5 TABLET, COATED ORAL at 09:38

## 2024-11-05 RX ADMIN — ACETAMINOPHEN 325MG 650 MG: 325 TABLET ORAL at 09:37

## 2024-11-05 RX ADMIN — METFORMIN HYDROCHLORIDE 500 MG: 500 TABLET ORAL at 09:38

## 2024-11-05 RX ADMIN — Medication 1 TABLET: at 09:38

## 2024-11-05 RX ADMIN — VITAM B12 100 MCG: 100 TAB at 09:38

## 2024-11-05 RX ADMIN — DOCUSATE SODIUM 100 MG: 100 CAPSULE, LIQUID FILLED ORAL at 09:38

## 2024-11-05 RX ADMIN — Medication 2000 UNITS: at 09:38

## 2024-11-05 RX ADMIN — AMLODIPINE BESYLATE 2.5 MG: 2.5 TABLET ORAL at 09:38

## 2024-11-05 RX ADMIN — GABAPENTIN 300 MG: 300 CAPSULE ORAL at 09:37

## 2024-11-05 RX ADMIN — LOSARTAN POTASSIUM 50 MG: 25 TABLET, FILM COATED ORAL at 09:42

## 2024-11-05 ASSESSMENT — PAIN SCALES - GENERAL
PAINLEVEL_OUTOF10: 0
PAINLEVEL_OUTOF10: 5
PAINLEVEL_OUTOF10: 3
PAINLEVEL_OUTOF10: 4

## 2024-11-05 ASSESSMENT — PAIN DESCRIPTION - ORIENTATION: ORIENTATION: LOWER

## 2024-11-05 ASSESSMENT — PAIN DESCRIPTION - DESCRIPTORS
DESCRIPTORS: DISCOMFORT
DESCRIPTORS: DULL;ACHING
DESCRIPTORS: ACHING;DULL

## 2024-11-05 ASSESSMENT — PAIN DESCRIPTION - FREQUENCY: FREQUENCY: CONTINUOUS

## 2024-11-05 ASSESSMENT — PAIN DESCRIPTION - LOCATION
LOCATION: BACK

## 2024-11-05 ASSESSMENT — PAIN - FUNCTIONAL ASSESSMENT: PAIN_FUNCTIONAL_ASSESSMENT: ACTIVITIES ARE NOT PREVENTED

## 2024-11-05 NOTE — PLAN OF CARE
Problem: Chronic Conditions and Co-morbidities  Goal: Patient's chronic conditions and co-morbidity symptoms are monitored and maintained or improved  11/5/2024 0954 by Josee Hare RN  Outcome: Progressing  11/4/2024 2138 by Kinjal De Leon RN  Outcome: Progressing     Problem: Discharge Planning  Goal: Discharge to home or other facility with appropriate resources  11/5/2024 0954 by Josee Hare RN  Outcome: Progressing  11/4/2024 2138 by Kinjal De Leon RN  Outcome: Progressing     Problem: Pain  Goal: Verbalizes/displays adequate comfort level or baseline comfort level  11/5/2024 0954 by Josee Hare RN  Outcome: Progressing  11/4/2024 2138 by Kinjal De Leon RN  Outcome: Progressing     Problem: Safety - Adult  Goal: Free from fall injury  11/5/2024 0954 by Josee Hare RN  Outcome: Progressing  11/4/2024 2138 by Kinjal De Leon RN  Outcome: Progressing     Problem: ABCDS Injury Assessment  Goal: Absence of physical injury  11/5/2024 0954 by Josee Hare RN  Outcome: Progressing  11/4/2024 2138 by Kinjal De Leon RN  Outcome: Progressing

## 2024-11-05 NOTE — PROGRESS NOTES
Physical Therapy Med Surg Initial Assessment  Facility/Department: [address] ORTHO TELE  Room: [place] [id number]       NAME: Leighton Yoon  : 1945 (79 y.o.)  MRN: [id number]  CODE STATUS: Full Code    Date of Service: 2024    Patient Diagnosis(es): Spinal stenosis, lumbar region, with neurogenic claudication [M48.062]  Spinal stenosis of lumbar region with neurogenic claudication [M48.062]   No chief complaint on file.    Patient Active Problem List    Diagnosis Date Noted    Spinal stenosis of lumbar region with neurogenic claudication 2024    Arthritis 10/29/2024    Diverticulosis 10/29/2024    Hypercholesteremia 10/29/2024    Hypertension 10/29/2024    TIA (transient ischemic attack) 10/29/2024    Spinal stenosis, lumbar region, with neurogenic claudication 10/16/2024    Diabetes mellitus type II, non insulin dependent (HCC) 10/08/2024    Type 2 diabetes mellitus with diabetic neuropathy, without long-term current use of insulin (HCC) 2021    Obesity, Class I, BMI 30-34.9 2019    History of colonic polyps 2017    Vitamin D deficiency 2016    Secondary hyperparathyroidism (HCC) 06/10/2015    Obstructive sleep apnea 2015    S/P shoulder surgery 2014    Asthma 10/01/1980        Past Medical History:   Diagnosis Date    Arthritis     Asthma     mild-intermittent    Cerebral artery occlusion with cerebral infarction (HCC)     TIA x1 (>10 yrs ago)    Diabetes mellitus (HCC)     Elevated liver enzymes 2022    Hyperkalemia 2021    Hyperlipidemia     Hypertension     Hyponatremia 2022    Neck mass 10/29/2024    Pneumonia of left lower lobe due to infectious organism 2022    Thyroid disease     hyperparathyroidism     Past Surgical History:   Procedure Laterality Date    COLONOSCOPY      EYE SURGERY Left     detached retina    FINGER SURGERY Left     left ring finger-fracture repair    JOINT REPLACEMENT Bilateral     Left Knee () & Right Knee

## 2024-11-05 NOTE — PLAN OF CARE
Problem: Chronic Conditions and Co-morbidities  Goal: Patient's chronic conditions and co-morbidity symptoms are monitored and maintained or improved  Outcome: Progressing     Problem: Discharge Planning  Goal: Discharge to home or other facility with appropriate resources  Outcome: Progressing     Problem: Pain  Goal: Verbalizes/displays adequate comfort level or baseline comfort level  11/4/2024 2138 by Kinjal De Leon RN  Outcome: Progressing  11/4/2024 1457 by Peggy Roberto RN  Outcome: Progressing     Problem: Safety - Adult  Goal: Free from fall injury  11/4/2024 2138 by Kinjal De Leon RN  Outcome: Progressing  11/4/2024 1457 by Peggy Roberto RN  Outcome: Progressing     Problem: ABCDS Injury Assessment  Goal: Absence of physical injury  Outcome: Progressing

## 2024-11-07 ENCOUNTER — TELEPHONE (OUTPATIENT)
Age: 79
End: 2024-11-07

## 2024-11-07 RX ORDER — CEPHALEXIN 500 MG/1
500 CAPSULE ORAL 3 TIMES DAILY
Qty: 21 CAPSULE | Refills: 0 | Status: SHIPPED | OUTPATIENT
Start: 2024-11-07 | End: 2024-11-14

## 2024-11-07 NOTE — TELEPHONE ENCOUNTER
I spoke with patient's wife and son this afternoon.  Patient's wife reports he had a fever last night and ran into this morning.  Additionally notes some light red watery drainage from the wound soaking through the gauze.  After discussion with , we will prophylactically start Keflex antibiotic.

## 2024-11-07 NOTE — TELEPHONE ENCOUNTER
Patient's wife calling. Patient is post op LUMBAR 2 3, 3 4, 4 5 MICRODECOMPRESSIONS (Spine Lumbar)  on 11/4/24.   He is running a fever 100.7 since yesterday. Getting chills.   Slight drainage from wound site but otherwise \"looks good\" per wife.  Please advise.

## 2024-11-08 NOTE — PROGRESS NOTES
Patient Name: Leighton Yoon : 1945        Date: 2024      Type of Appt: Post Op    Reason for appt: 24 LUMBAR 2 3, 3 4, 4 5 MICRODECOMPRESSIONS.     Pt last seen by Dr Phelps on 10/8/2024    Studies done: NO NEW STUDIES     Surgeries: 24 LUMBAR 2 3, 3 4, 4 5 MICRODECOMPRESSIONS.          [facility]  [address] [place] [id number]  O:   F:         Patient: Leighton Yoon  YOB: 1945  Date: 2024    The patient is a 79 y.o. male who presents today for follow up.    Patient doing well with significant improvement postoperatively.  Much less pain to no pain.  Uses a walker for balance.  Has increase strength and sensation both lower extremities wound is healing well.  Without the walker he can walk independently with minimal assistance support his weight and heel walk and toe walk.  Start physical therapy increase his activities as tolerated if he has any questions or problems contact the office.          DELORES PHELPS MD

## 2024-11-21 ENCOUNTER — OFFICE VISIT (OUTPATIENT)
Age: 79
End: 2024-11-21
Payer: MEDICARE

## 2024-11-21 VITALS
WEIGHT: 219 LBS | DIASTOLIC BLOOD PRESSURE: 74 MMHG | SYSTOLIC BLOOD PRESSURE: 122 MMHG | BODY MASS INDEX: 34.37 KG/M2 | HEIGHT: 67 IN

## 2024-11-21 DIAGNOSIS — M48.062 SPINAL STENOSIS, LUMBAR REGION, WITH NEUROGENIC CLAUDICATION: Primary | ICD-10-CM

## 2024-11-21 PROCEDURE — 99024 POSTOP FOLLOW-UP VISIT: CPT | Performed by: NEUROLOGICAL SURGERY

## 2024-11-21 PROCEDURE — 99213 OFFICE O/P EST LOW 20 MIN: CPT | Performed by: NEUROLOGICAL SURGERY

## 2024-12-03 ENCOUNTER — HOSPITAL ENCOUNTER (OUTPATIENT)
Dept: PHYSICAL THERAPY | Age: 79
Setting detail: THERAPIES SERIES
Discharge: HOME | End: 2024-12-03
Payer: MEDICARE

## 2024-12-03 PROCEDURE — 97161 PT EVAL LOW COMPLEX 20 MIN: CPT

## 2024-12-03 NOTE — THERAPY EVALUATION
Physical Therapy Evaluation/Plan of Care   [facility]   [facility] [address]  Dept: [phone]  Dept Fax: [phone]  Loc: [phone]    Physical Therapy: Initial Evaluation    General Information    Patient: Leighton Yoon (79 y.o.     male)   Examination Date: 2024   :  1945 ;    Confirmed: Yes MRN: [id number]  CSN: [id number]   Insurance: Payor: [facility] MEDICARE / Plan: [facility] MEDICARE / Product Type: *No Product type* /   Insurance ID: [id number] - (Medicare Managed)  PT Insurance Information: BCBS Medicare Secondary Insurance (if applicable):     Referring Physician: Dio Hill PA-C       Visits to Date/Visits Approved: 1 /      No Show/Cancelled Appts: 0 / 0     Medical Diagnosis: Spinal stenosis, lumbar region, with neurogenic claudication [M48.062]        Treatment Diagnosis: LBP, abnormal posture, LE weakness, stiffness      SUBJECTIVE:     Onset date:     Subjective/ Mechanism of Injury: Patient reports to therapy after lumbar surgery on . Reports he has pain since surgery. Was previously taking pain meds but has reduced to tylenol in the am and pm. Reports difficulty with moderate to long distance walking and standing. Reports N/T into LE, L > R. States not significant change since surgery, report it has been occurring for about 10-15 years. Reports x1 fall about 2 years ago, but nothing recently. No issues with steps at home. Was using WW after surgery but no AD currently. States no restrictions, was told \"whatever you feel comfortable with, do it\". Reports no issues with sitting or laying down.     Precautions/Contraindications/Restrictions: none           Changes in Bowel/Bladder Function: no  Saddle Anesthesia: no  Unexplained Weight Loss: no  Unrelenting Night Pain: no  Sudden Weakness/Falls: no    Interventions for current problem: medications , surgery      Pain:   Current:

## 2024-12-06 ENCOUNTER — HOSPITAL ENCOUNTER (OUTPATIENT)
Dept: PHYSICAL THERAPY | Age: 79
Setting detail: THERAPIES SERIES
Discharge: HOME | End: 2024-12-06
Payer: MEDICARE

## 2024-12-06 PROCEDURE — 97110 THERAPEUTIC EXERCISES: CPT

## 2024-12-06 NOTE — PROGRESS NOTES
[facility]  Outpatient Physical Therapy   Treatment Note        Date: 2024  Patient: Leighton Yoon  : 1945   Confirmed: Yes  MRN: [id number]  Referring Provider: Dio Hill PA-C      Medical Diagnosis: Spinal stenosis, lumbar region, with neurogenic claudication [M48.062]      Treatment Diagnosis: LBP, abnormal posture, LE weakness, stiffness    Visit Information:  Insurance: Payor: [facility] MEDICARE / Plan: [facility] MEDICARE / Product Type: *No Product type* /   PT Visit Information  PT Insurance Information: BCBS Medicare  Total # of Visits Approved: 10  Total # of Visits to Date: 1  Plan of Care/Certification Expiration Date: 25  No Show: 0  Progress Note Due Date: 25  Canceled Appointment: 0  Progress Note Counter: 1/10    Subjective Information:  Subjective: States he is feeling good today, no pain, only increased with prolonged standing. States he did feel stiff from the day after evaluation.  HEP Compliance:  [x] Good [] Fair [] Poor [] Reports not doing due to:    Pain Screening  Patient Currently in Pain: Denies    Treatment:  Exercises:  Exercises  Exercise 1: hamstring str with stool 3 reps x 20 sec, dayne  Exercise 2: modified shanta with strap (for hip flexor and quad) 3 reps x 20 sec holds, dayne  Exercise 3: H/L hip: abd & march YTB x15 reps, hip add with ball 15 reps x 3 sec holds  Exercise 5: SLR x10 reps, dayne  Exercise 7: LTR 10 reps x 3 sec holds  Exercise 10: pball rolls 5 reps x 10 sec, holds each  Exercise 13: Scifit L1.5 x5 min  Exercise 20: HEP: LTR, SLR, ham str, H/L hip    Modalities:  Cryotherapy (CPT 96108)  Patient Position: Seated  Number Minutes Cryotherapy: 10  Cryotherapy location: Low back  Post treatment skin assessment: Intact  Limitations addressed: Pain modulation    *Indicates exercise, modality, or manual techniques to be initiated when appropriate    Objective Measures: NT    Assessment:   Body Structures, Functions, Activity Limitations

## 2024-12-11 ENCOUNTER — HOSPITAL ENCOUNTER (OUTPATIENT)
Dept: PHYSICAL THERAPY | Age: 79
Setting detail: THERAPIES SERIES
Discharge: HOME | End: 2024-12-11
Payer: MEDICARE

## 2024-12-11 PROCEDURE — 97110 THERAPEUTIC EXERCISES: CPT

## 2024-12-11 NOTE — PROGRESS NOTES
[facility]  Outpatient Physical Therapy   Treatment Note        Date: 2024  Patient: Leighton Yoon  : 1945   Confirmed: Yes  MRN: [id number]  Referring Provider: Dio Hill PA-C      Medical Diagnosis: Spinal stenosis, lumbar region, with neurogenic claudication [M48.062]      Treatment Diagnosis: LBP, abnormal posture, LE weakness, stiffness    Visit Information:  Insurance: Payor: [place] [facility] MEDICARE / Plan: [facility] MEDICARE / Product Type: *No Product type* /   PT Visit Information  PT Insurance Information: BCBS Medicare  Total # of Visits Approved: 10  Total # of Visits to Date: 2  Plan of Care/Certification Expiration Date: 25  No Show: 0  Progress Note Due Date: 25  Canceled Appointment: 0  Progress Note Counter: 2/10    Subjective Information:  Subjective: Pt noted he is still having trouble standing straight, but has been trying.  When standing or walking for more than 10 minutes he needs to sit d/t the pain.  HEP Compliance:  [x] Good [] Fair [] Poor [] Reports not doing due to:               Pain Screening  Patient Currently in Pain: Denies    Treatment:  Exercises:  Exercises  Exercise 1: hamstring str with stool 3 reps x 20 sec, dayne  Exercise 2: modified shanta with strap (for hip flexor and quad) 3 reps x 20 sec holds, dayne  Exercise 3: H/L hip: abd & march YTB x15 reps, hip add with ball 15 reps x 3 sec holds  Exercise 4: bridge x 10  Exercise 5: SLR x10 reps, dayne  Exercise 7: LTR 10 reps x 5 sec holds  Exercise 10: pball rolls 5 reps x 10 sec, holds each  Exercise 13: Scifit L2 x5 min  Exercise 20: HEP: LTR, SLR, ham str, H/L hip       Modalities:  Cryotherapy (CPT 62499)  Patient Position: Seated  Number Minutes Cryotherapy: 10  Cryotherapy location: Low back  Post treatment skin assessment: Intact  Limitations addressed: Pain modulation       *Indicates exercise, modality, or manual techniques to be initiated when appropriate      Assessment:   Body

## 2024-12-13 ENCOUNTER — HOSPITAL ENCOUNTER (OUTPATIENT)
Dept: PHYSICAL THERAPY | Age: 79
Setting detail: THERAPIES SERIES
Discharge: HOME | End: 2024-12-13
Payer: MEDICARE

## 2024-12-13 PROCEDURE — 97110 THERAPEUTIC EXERCISES: CPT

## 2024-12-13 NOTE — PROGRESS NOTES
[facility]  Outpatient Physical Therapy    Treatment Note        Date: 2024  Patient: Leighton Yoon  : 1945   Confirmed: Yes  MRN: [id number]  Referring Provider: Dio Hill PA-C    Medical Diagnosis: Spinal stenosis, lumbar region, with neurogenic claudication [M48.062]       Treatment Diagnosis: LBP, abnormal posture, LE weakness, stiffness    Visit Information:  Insurance: Payor: [place] [facility] MEDICARE / Plan: [facility] [place] MED[place]ARE / Product Type: *No Product type* /   PT Visit Information  PT Insurance Information: BCBS Medicare  Total # of Visits Approved: 10  Total # of Visits to Date: 3  Plan of Care/Certification Expiration Date: 25  No Show: 0  Progress Note Due Date: 25  Canceled Appointment: 0  Progress Note Counter: 3/10    Subjective Information:  Subjective: Pt states no pain today  HEP Compliance:  [x] Good [] Fair [] Poor [] Reports not doing due to:  Pain Screening  Patient Currently in Pain: Denies    Treatment:  Exercises:  Exercises  Exercise 1: hamstring str with stool 3 reps x 20 sec, dayne  Exercise 2: modified shanta with strap (for hip flexor and quad) 3 reps x 20 sec holds, dayne  Exercise 3: H/L hip: abd & march YTB x15 reps, hip add with ball 15 reps x 3 sec holds  Exercise 4: bridge 3-5 x 10  Exercise 5: SLR x15 reps, dayne  Exercise 6: heel taps Supine with TA x10  Exercise 7: LTR 10 reps x 5 sec holds  Exercise 8: reverse crunch with ball btwn knees x 10  Exercise 10: pball rolls 3 reps x 10 sec, holds each  Exercise 13: Scifit L2.3 x5 min  Exercise 20: HEP: cont current       Manual: NA          Modalities:  Cryotherapy (CPT 57483)  Patient Position: Seated  Number Minutes Cryotherapy: 10  Cryotherapy location: Low back  Post treatment skin assessment: Intact  Limitations addressed: Pain modulation       *Indicates exercise, modality, or manual techniques to be initiated when appropriate    Objective Measures:      Strength: [x] NT  [] MMT completed:

## 2024-12-16 ENCOUNTER — HOSPITAL ENCOUNTER (OUTPATIENT)
Dept: PHYSICAL THERAPY | Age: 79
Setting detail: THERAPIES SERIES
Discharge: HOME | End: 2024-12-16
Payer: MEDICARE

## 2024-12-16 PROCEDURE — 97110 THERAPEUTIC EXERCISES: CPT

## 2024-12-16 NOTE — PROGRESS NOTES
[facility]  Outpatient Physical Therapy    Treatment Note        Date: 2024  Patient: Leighton Yoon  : 1945   Confirmed: Yes  MRN: [id number]  Referring Provider: Dio Hill PA-C    Medical Diagnosis: Spinal stenosis, lumbar region, with neurogenic claudication [M48.062]       Treatment Diagnosis: LBP, abnormal posture, LE weakness, stiffness    Visit Information:  Insurance: Payor: [place] [facility] [place]ICARE / Plan: [facility] [place] / Product Type: *No Product type* /   PT Visit Information  PT Insurance Information: BCBS Medicare  Total # of Visits Approved: 10  Total # of Visits to Date: 4  Plan of Care/Certification Expiration Date: 25  No Show: 0  Progress Note Due Date: 25  Canceled Appointment: 0  Progress Note Counter: 4/10    Subjective Information:  Subjective: Patient with pain over weekend, states he maybe overdid it with Zoroastrian activity. Pain relieved after rest. Patient states 40% improvement since start of therapy.  HEP Compliance:  [x] Good [] Fair [] Poor [] Reports not doing due to:               Pain Screening  Patient Currently in Pain: No    Treatment:  Exercises:  Exercises  Exercise 3: H/L hip: abd & march RTB x15 reps, hip add with ball 15 reps x 3 sec holds  Exercise 4: bridge  x 10  Exercise 5: SLR x15 reps, dayne  Exercise 6: heel taps Supine with TA x10  Exercise 7: LTR 10 reps x 5 sec holds  Exercise 8: reverse crunch with pball btwn knees x 10  Exercise 9: prone x2 min, prone prop x5  Exercise 13: Scifit L3 x5 min  Exercise 14: pball walkouts with TA iso's  Exercise 20: HEP: cont current, prone laying and prone prop  Treatment Reasoning  Limitations addressed: Mobility, Strength, Flexibility, Activity tolerance  Progressed: Complexity of movement  Progressed: pball walk outs x10 with TA iso's  Functional ability(s) targeted: Tolerance to age appropriate activities      Modalities:  Cryotherapy (CPT 47628)  Patient Position: Seated  Number Minutes

## 2024-12-20 ENCOUNTER — HOSPITAL ENCOUNTER (OUTPATIENT)
Dept: PHYSICAL THERAPY | Age: 79
Setting detail: THERAPIES SERIES
Discharge: HOME | End: 2024-12-20
Payer: MEDICARE

## 2024-12-20 PROCEDURE — 97110 THERAPEUTIC EXERCISES: CPT

## 2024-12-20 NOTE — PROGRESS NOTES
techniques to be initiated when appropriate    *Indicates exercise, modality, or manual techniques to be initiated when appropriate    Objective Measures: NT    Assessment:   Body Structures, Functions, Activity Limitations Requiring Skilled Therapeutic Intervention: Decreased ROM, Decreased body mechanics, Decreased tolerance to work activity, Decreased strength, Increased pain, Decreased posture  Assessment: Patient presented to therapy without pain this date. Trialed progression of activity with transitioning from hip strengthening on mat to functional walking exercises to improve hip and core strength. Increased reps this date with good tolerance. Modified prone on elbows to standing lumbar extension to reduce difficulty with obtaining prone position. Concluded with CP for soreness relief.  Treatment Diagnosis: LBP, abnormal posture, LE weakness, stiffness  Therapy Prognosis: Good    Post-Pain Assessment:       Pain Rating (0-10 pain scale):   0/10   Location and pain description same as pre-treatment unless indicated.   Action: [] NA   [x] Perform HEP  [] Meds as prescribed  [] Modalities as prescribed   [] Call Physician     GOALS   Patient Goal(s): Patient Goals : \"back to normal, return to %\"    Long Term Goals Completed by 5 weeks Goal Status   LTG 1 Patient will demonstrate normal and pain-free lumbar ROM, to demonstrate improved strength and postural mechanincs. In progress   LTG 2 Patient will demonstrate 5/5 bilateral strength for improved WBing tolerance during all activities. 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 In progress   LTG 5 Patient will report </=2/45-50 on BRANDON to demonstrate improved subjective functional mobility. In progress

## 2024-12-23 ENCOUNTER — HOSPITAL ENCOUNTER (OUTPATIENT)
Dept: PHYSICAL THERAPY | Age: 79
Setting detail: THERAPIES SERIES
Discharge: HOME | End: 2024-12-23
Payer: MEDICARE

## 2024-12-23 PROCEDURE — 97110 THERAPEUTIC EXERCISES: CPT

## 2024-12-23 ASSESSMENT — PAIN SCALES - GENERAL: PAINLEVEL_OUTOF10: 1

## 2024-12-23 ASSESSMENT — PAIN DESCRIPTION - LOCATION: LOCATION: BACK

## 2024-12-23 ASSESSMENT — PAIN DESCRIPTION - DESCRIPTORS: DESCRIPTORS: TIGHTNESS

## 2024-12-23 NOTE — PROGRESS NOTES
[facility]  Outpatient Physical Therapy    Treatment Note        Date: 2024  Patient: Leighton Yoon  : 1945   Confirmed: Yes  MRN: [id number]  Referring Provider: Dio Hill PA-C    Medical Diagnosis: Spinal stenosis, lumbar region, with neurogenic claudication [M48.062]       Treatment Diagnosis: LBP, abnormal posture, LE weakness, stiffness    Visit Information:  Insurance: Payor: [place] [facility] MEDICARE / Plan: [facility] [place] MEDICARE / Product Type: *No Product type* /   PT Visit Information  PT Insurance Information: BCBS Medicare  Total # of Visits Approved: 10  Total # of Visits to Date: 6  Plan of Care/Certification Expiration Date: 25  No Show: 0  Progress Note Due Date: 25  Canceled Appointment: 0  Progress Note Counter: 6/10    Subjective Information:  Subjective: Patient reports increased stiffness today. Feels PT is helping so far but difficulty ambulating far distances and standing >15mins.  HEP Compliance:  [x] Good [] Fair [] Poor [] Reports not doing due to:               Pain Screening  Patient Currently in Pain: Yes  Pain Assessment: 0-10  Pain Level: 1  Pain Location: Back  Pain Descriptors: Tightness    Treatment:  Exercises:  Exercises  Exercise 7: standing plank rotation 10 reps x 3 sec holds  Exercise 10: pball rolls 3 reps x 10 sec, holds each  Exercise 11: paloff press YTB x10 reps, dayne  Exercise 13: Scifit L3.5 x5 min  Exercise 15: STS from chair YTB above knees 10reps x 2 sets  Exercise 16: Supine DLS x10 ea  Exercise 17: Lats/rows GTB x10  Exercise 18: 6'' step ups F/L x10  Exercise 20: HEP: cont current         Modalities:  Cryotherapy (CPT 86025)  Patient Position: Seated  Number Minutes Cryotherapy: 10  Cryotherapy location: Low back  Post treatment skin assessment: Intact  Limitations addressed: Pain modulation       *Indicates exercise, modality, or manual techniques to be initiated when appropriate    Objective Measures:           Strength: [] NT  [x]

## 2024-12-27 ENCOUNTER — HOSPITAL ENCOUNTER (OUTPATIENT)
Dept: PHYSICAL THERAPY | Age: 79
Setting detail: THERAPIES SERIES
Discharge: HOME | End: 2024-12-27
Payer: MEDICARE

## 2024-12-27 PROCEDURE — 97110 THERAPEUTIC EXERCISES: CPT

## 2024-12-27 ASSESSMENT — PAIN DESCRIPTION - LOCATION: LOCATION: BACK

## 2024-12-27 ASSESSMENT — PAIN SCALES - GENERAL: PAINLEVEL_OUTOF10: 2

## 2024-12-27 ASSESSMENT — PAIN DESCRIPTION - ORIENTATION: ORIENTATION: LOWER

## 2024-12-27 ASSESSMENT — PAIN DESCRIPTION - DESCRIPTORS: DESCRIPTORS: TIGHTNESS

## 2024-12-27 NOTE — PROGRESS NOTES
[facility]  Outpatient Physical Therapy    Treatment Note        Date: 2024  Patient: Leighton Yoon  : 1945   Confirmed: Yes  MRN: [id number]  Referring Provider: Dio Hill PA-C    Medical Diagnosis: Spinal stenosis, lumbar region, with neurogenic claudication [M48.062]       Treatment Diagnosis: LBP, abnormal posture, LE weakness, stiffness    Visit Information:  Insurance: Payor: [place] [facility] MEDICARE / Plan: [facility] [place] MED[place]ARE / Product Type: *No Product type* /   PT Visit Information  PT Insurance Information: BCBS Medicare  Total # of Visits Approved: 10  Total # of Visits to Date: 7  Plan of Care/Certification Expiration Date: 25  No Show: 0  Progress Note Due Date: 25  Canceled Appointment: 0  Progress Note Counter: 7/10    Subjective Information: Patient without new reports since last visit.      HEP Compliance:  [x] Good [] Fair [] Poor [] Reports not doing due to:    Pain Screening  Patient Currently in Pain: Yes  Pain Assessment: 0-10  Pain Level: 2  Pain Location: Back  Pain Orientation: Lower  Pain Descriptors: Tightness    Treatment:  Exercises:  Exercises  Exercise 3: lateral steps and walking march n hold RTB x3 laps, each  Exercise 10: pball rolls 3 reps x 10 sec, holds each  Exercise 11: paloff press GTB x10 reps, dayne  Exercise 13: Scifit L3.5 x5 min  Exercise 15: STS from chair YTB above knees 10reps x 2 sets  Exercise 16: Supine: march, bent knee fallout x10  Exercise 17: Lats/rows GTB x10   Modalities:  Cryotherapy (CPT 79325)  Patient Position: Seated  Number Minutes Cryotherapy: 10  Cryotherapy location: Low back  Post treatment skin assessment: Intact  Limitations addressed: Pain modulation    *Indicates exercise, modality, or manual techniques to be initiated when appropriate  Assessment:   Body Structures, Functions, Activity Limitations Requiring Skilled Therapeutic Intervention: Decreased ROM, Decreased body mechanics, Decreased tolerance to work  Problem: Patient Care Overview  Goal: Plan of Care Review   05/10/18 0634   Coping/Psychosocial   Care Plan Reviewed With mother   Plan of Care Review   Progress improving

## 2024-12-30 ENCOUNTER — HOSPITAL ENCOUNTER (OUTPATIENT)
Dept: PHYSICAL THERAPY | Age: 79
Setting detail: THERAPIES SERIES
Discharge: HOME | End: 2024-12-30
Payer: MEDICARE

## 2024-12-30 PROCEDURE — 97110 THERAPEUTIC EXERCISES: CPT

## 2024-12-30 NOTE — PROGRESS NOTES
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 Also continued on mobility tasks to improve ROM of lumbar and hips. Patient tolerated well, no increase in familiar pain. However concluded with CP.  Treatment Diagnosis: LBP, abnormal posture, LE weakness, stiffness  Therapy Prognosis: Good    Post-Pain Assessment:       Pain Rating (0-10 pain scale):   \"just you\"/10   Location and pain description same as pre-treatment unless indicated.   Action: [] NA   [x] Perform HEP  [] Meds as prescribed  [] Modalities as prescribed   [] Call Physician     GOALS   Patient Goal(s): Patient Goals : \"back to normal, return to %\"    Long Term Goals Completed by 5 weeks Goal Status   LTG 1 Patient will demonstrate normal and pain-free lumbar ROM, to demonstrate improved strength and postural mechanincs. In progress   LTG 2 Patient will demonstrate 5/5 bilateral strength for improved WBing tolerance during all activities. 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 In progress   LTG 5 Patient will report </=2/45-50 on BRANDON to demonstrate improved subjective functional mobility. In progress          Plan:  Frequency/Duration:  Plan  Plan Frequency: 2  Plan weeks: 5  Current Treatment Recommendations: Strengthening, ROM, Balance training, Functional mobility training, Neuromuscular

## 2025-01-03 ENCOUNTER — HOSPITAL ENCOUNTER (OUTPATIENT)
Dept: PHYSICAL THERAPY | Age: 80
Setting detail: THERAPIES SERIES
Discharge: HOME | End: 2025-01-03
Payer: MEDICARE

## 2025-01-03 PROCEDURE — 97110 THERAPEUTIC EXERCISES: CPT

## 2025-01-03 NOTE — PROGRESS NOTES
[facility]  Outpatient Physical Therapy   Treatment Note        Date: 1/3/2025  Patient: Leighton Yoon  : 1945   Confirmed: Yes  MRN: [id number]  Referring Provider: Dio Hill PA-C      Medical Diagnosis: Spinal stenosis, lumbar region, with neurogenic claudication [M48.062]      Treatment Diagnosis: LBP, abnormal posture, LE weakness, stiffness    Visit Information:  Insurance: Payor: [place] [facility] MEDICARE / Plan: [facility] [place]ARE / Product Type: *No Product type* /   PT Visit Information  PT Insurance Information: BCBS Medicare  Total # of Visits Approved: 10  Total # of Visits to Date: 9  Plan of Care/Certification Expiration Date: 25  No Show: 0  Progress Note Due Date: 25  Canceled Appointment: 0  Progress Note Counter: 9/10    Subjective Information:  Subjective: Patient late because of weather and traffic. Reports his back is doing good today.  HEP Compliance:  [x] Good [] Fair [] Poor [] Reports not doing due to:    Pain Screening  Patient Currently in Pain: Denies    Treatment:  Exercises:  Exercises  Exercise 1: hamstring str with stool 5 reps x 20 sec, dayne  Exercise 3: lateral steps, walking march n hold, fwd monsters RTB at ankle x3 laps, each  Exercise 4: bridge  x10 reps ; bridge with march x10 reps  Exercise 6: standing hip abd RTB x15 reps, dayne  Exercise 13: Scifit L3.0 x5 min  Exercise 14: LTR with pball x10 reps  Exercise 16: DKTC with pball x20 reps  Exercise 20: HEP: cont current    Modalities:  Cryotherapy (CPT 95414)  Patient Position: Seated  Number Minutes Cryotherapy: 10  Cryotherapy location: Low back  Post treatment skin assessment: Intact  Limitations addressed: Pain modulation    *Indicates exercise, modality, or manual techniques to be initiated when appropriate    Objective Measures: NT    Assessment:   Body Structures, Functions, Activity Limitations Requiring Skilled Therapeutic Intervention: Decreased ROM, Decreased body mechanics, Decreased

## 2025-01-06 ENCOUNTER — HOSPITAL ENCOUNTER (OUTPATIENT)
Dept: PHYSICAL THERAPY | Age: 80
Setting detail: THERAPIES SERIES
Discharge: HOME | End: 2025-01-06
Payer: MEDICARE

## 2025-01-06 PROCEDURE — 97110 THERAPEUTIC EXERCISES: CPT

## 2025-01-06 NOTE — PLAN OF CARE
[facility]  PHYSICAL THERAPY PLAN OF CARE                                    [facility] [place] [address]     Ph: [phone]  Fax: [phone]    [] Certification  [x] Recertification [x]  Plan of Care  [] Progress Note [] Discharge      Referring Provider: Dio Hill PA-C    From:  Annie Duran, PT, DPT    Patient: Leighton Yoon (79 y.o. male) : 1945 Date: 2025   Medical Diagnosis: Spinal stenosis, lumbar region, with neurogenic claudication [M48.062]    Treatment Diagnosis: LBP, abnormal posture, LE weakness, stiffness    Plan of Care/Certification Expiration Date: 25   Progress Report Period from: 2024  to 2025    Visits to Date: 10 No Show: 0 Cancelled Appts: 0    OBJECTIVE:     Long Term Goals - Time Frame for Long Term Goals : 5 weeks  Goals Current/ Discharge status Status   Long Term Goal 1: Patient will demonstrate normal and pain-free lumbar ROM, to demonstrate improved strength and postural mechanincs. LTG 1 Current Status:: : flexion % ; extension: 50% - no pain   In progress, Partially met   Long Term Goal 2: Patient will demonstrate 5/5 bilateral strength for improved WBing tolerance during all activities. 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   In progress   Long Term Goal 4: Patient will report avg pain of </=2/10 during functional activities to demonstrate improved QOL. LTG 4 Current Status:: : 0/10 but will get pain 4-5 with prolonged walking and standing   In progress   Long Term Goal 5: Patient will report </=2/45-50 on BRANDON to demonstrate improved subjective functional mobility. LTG 5 Current Status:: : 13/50 (10/45 at

## 2025-01-06 NOTE — PROGRESS NOTES
[facility]  Outpatient Physical Therapy   Treatment Note        Date: 2025  Patient: Leighton Yoon  : 1945   Confirmed: Yes  MRN: [id number]  Referring Provider: Dio Hill PA-C      Medical Diagnosis: Spinal stenosis, lumbar region, with neurogenic claudication [M48.062]      Treatment Diagnosis: LBP, abnormal posture, LE weakness, stiffness    Visit Information:  Insurance: Payor: [place] [facility] MEDICARE / Plan: [facility] [place] MEDICARE / Product Type: *No Product type* /   PT Visit Information  PT Insurance Information: BCBS Medicare  Total # of Visits Approved: 10  Total # of Visits to Date: 10  Plan of Care/Certification Expiration Date: 25  No Show: 0  Progress Note Due Date: 25  Canceled Appointment: 0  Progress Note Counter: 10/10    Subjective Information:  Subjective: reports to feeling a little achy/sore from standing 30-45 min making soup this morning. Otherwise doing ok currently.  HEP Compliance:  [x] Good [] Fair [] Poor [] Reports not doing due to:    Pain Screening  Patient Currently in Pain: Denies    Treatment:  Exercises:  Exercises  Exercise 1: hamstring str with stool 3 reps x 20 sec, dayne  Exercise 9: lumbar extension with pball 10 reps x 3 sec holds  Exercise 10: pball rolls 3 reps x 20 sec, holds each  Exercise 13: Scifit L3.0 x5 min  Exercise 16: DKTC with pball x20 reps  Exercise 19: objective measures  Exercise 20: HEP: cont current    Modalities:  Cryotherapy (CPT 93160)  Patient Position: Seated  Number Minutes Cryotherapy: 10  Cryotherapy location: Low back  Post treatment skin assessment: Intact  Limitations addressed: Pain modulation  *Indicates exercise, modality, or manual techniques to be initiated when appropriate    Objective Measures:     Strength RLE  R Hip Flexion: 4+/5  R Hip Extension: 4+/5  R Hip ABduction: 5/5  Strength LLE  L Hip Flexion: 5/5  L Hip Extension: 4-/5  L Hip ABduction: 4+/5    LTG 1 Current Status:: : flexion % ;

## 2025-01-10 ENCOUNTER — HOSPITAL ENCOUNTER (OUTPATIENT)
Dept: PHYSICAL THERAPY | Age: 80
Setting detail: THERAPIES SERIES
Discharge: HOME | End: 2025-01-10
Payer: MEDICARE

## 2025-01-10 PROCEDURE — 97110 THERAPEUTIC EXERCISES: CPT

## 2025-01-10 NOTE — PROGRESS NOTES
[facility]  Outpatient Physical Therapy   Treatment Note        Date: 1/10/2025  Patient: Leighton Yoon  : 1945   Confirmed: Yes  MRN: [id number]  Referring Provider: Dio Hill PA-C      Medical Diagnosis: Spinal stenosis, lumbar region, with neurogenic claudication [M48.062]      Treatment Diagnosis: LBP, abnormal posture, LE weakness, stiffness    Visit Information:  Insurance: Payor: [facility] MEDICARE / Plan: [facility] MEDICARE / Product Type: *No Product type* /   PT Visit Information  PT Insurance Information: [facility] Medicare  Total # of Visits Approved: 4  Total # of Visits to Date:   Plan of Care/Certification Expiration Date: 25  No Show: 0  Progress Note Due Date: 25  Canceled Appointment: 0  Progress Note Counter:     Subjective Information:  Subjective: Patient reports he lifted a 50# bag of rice yesterday and felt muscle soreness, but no pain. Feels ok right now.  HEP Compliance:  [x] Good [] Fair [] Poor [] Reports not doing due to:    Pain Screening  Patient Currently in Pain: Denies    Treatment:  Exercises:  Exercises  Exercise 1: hamstring str with stool 5 reps x 20 sec, dayne  Exercise 6: steamboats RTB x10 reps, dayne  Exercise 7: standing plank rotation x10 reps ; 10 reps x 3#  Exercise 11: paloff press GTB x15 reps, dayne  Exercise 13: Scifit L3.0 x5 min  Exercise 15: STS from chair RTB above knees 10reps ; sumo position x10 reps  Exercise 18: 6'' step ups F/L/R x10 reps, dayne  Exercise 20: HEP: cont current + steamboats    *Indicates exercise, modality, or manual techniques to be initiated when appropriate    Objective Measures: NT    Assessment:   Body Structures, Functions, Activity Limitations Requiring Skilled Therapeutic Intervention: Decreased ROM, Decreased body mechanics, Decreased tolerance to work activity, Decreased strength, Increased pain, Decreased posture  Assessment: Patient with greater tolerance to activity today. Trialed progression of 
(0) independent

## 2025-01-17 ENCOUNTER — HOSPITAL ENCOUNTER (OUTPATIENT)
Dept: PHYSICAL THERAPY | Age: 80
Setting detail: THERAPIES SERIES
Discharge: HOME | End: 2025-01-17
Payer: MEDICARE

## 2025-01-17 PROCEDURE — 97110 THERAPEUTIC EXERCISES: CPT

## 2025-01-17 ASSESSMENT — PAIN DESCRIPTION - LOCATION: LOCATION: BACK

## 2025-01-17 ASSESSMENT — PAIN DESCRIPTION - DESCRIPTORS: DESCRIPTORS: ACHING

## 2025-01-17 ASSESSMENT — PAIN DESCRIPTION - ORIENTATION: ORIENTATION: LOWER

## 2025-01-17 ASSESSMENT — PAIN SCALES - GENERAL: PAINLEVEL_OUTOF10: 1

## 2025-01-17 NOTE — PROGRESS NOTES
Heat/Cold  Pt to continue current HEP.  See objective section for any therapeutic exercise changes, additions or modifications this date.    Therapy Time:      PT Individual Minutes  Time In: 1000  Time Out: 1050  Minutes: 50  Timed Code Treatment Minutes: 40 Minutes  Procedure Minutes:10  Timed Activity Minutes Units   Ther Ex 40 3     Electronically signed by Mayte Keyes PTA on 1/17/25 at 12:54 PM EST

## 2025-01-21 RX ORDER — ROSUVASTATIN CALCIUM 20 MG/1
20 TABLET, COATED ORAL DAILY
Qty: 90 TABLET | Refills: 1 | OUTPATIENT
Start: 2025-01-21

## 2025-01-21 NOTE — TELEPHONE ENCOUNTER
Patient should contact his primary care provider Dr. Nolan for continued management of Crestor medication Dio Hill PA-C

## 2025-01-24 ENCOUNTER — HOSPITAL ENCOUNTER (OUTPATIENT)
Dept: PHYSICAL THERAPY | Age: 80
Setting detail: THERAPIES SERIES
Discharge: HOME | End: 2025-01-24
Payer: MEDICARE

## 2025-01-24 PROCEDURE — 97110 THERAPEUTIC EXERCISES: CPT

## 2025-01-24 NOTE — PROGRESS NOTES
ROM, Balance training, Functional mobility training, Neuromuscular re-education, Manual, Pain management, Return to work related activity, Home exercise program, Safety education & training, Patient/Caregiver education & training, Modalities  Modalities: Heat/Cold  Pt to continue current HEP.  See objective section for any therapeutic exercise changes, additions or modifications this date.    Therapy Time:   PT Individual Minutes  Time In: 1113  Time Out: 1201  Minutes: 48  Timed Code Treatment Minutes: 38 Minutes  Procedure Minutes: 10    Timed Activity Minutes Units   Ther Ex 38 3     Electronically signed by Annie Duran PT on 1/24/25 at 12:49 PM EST

## 2025-01-31 ENCOUNTER — HOSPITAL ENCOUNTER (OUTPATIENT)
Dept: PHYSICAL THERAPY | Age: 80
Setting detail: THERAPIES SERIES
Discharge: HOME | End: 2025-01-31
Payer: MEDICARE

## 2025-01-31 PROCEDURE — 97110 THERAPEUTIC EXERCISES: CPT

## 2025-01-31 NOTE — THERAPY DISCHARGE
[facility]  PHYSICAL THERAPY PLAN OF CARE                                    [facility] [place] [address]     Ph: [phone]  Fax: [phone]    [] Certification  [] Recertification []  Plan of Care  [] Progress Note [x] Discharge      Referring Provider: Dio Hill PA-C    From:  Annie Duran, PT, DPT    Patient: Leighton Yoon (79 y.o. male) : 1945 Date: 2025   Medical Diagnosis: Spinal stenosis, lumbar region, with neurogenic claudication [M48.062]    Treatment Diagnosis: LBP, abnormal posture, LE weakness, stiffness    Plan of Care/Certification Expiration Date: 25   Progress Report Period from: 2025  to 2025    Visits to Date: 14 No Show: 0 Cancelled Appts: 0    OBJECTIVE:     Long Term Goals - Time Frame for Long Term Goals : 5 weeks  Goals Current/ Discharge status Status   Long Term Goal 1: Patient will demonstrate normal and pain-free lumbar ROM, to demonstrate improved strength and postural mechanincs. LTG 1 Current Status:: : ROM WFL, no pain   Met   Long Term Goal 2: Patient will demonstrate 5/5 bilateral strength for improved WBing tolerance during all activities. 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 LTG 3 Current Status:: : Reports he was able to stand for 45 min w/o needing break to make his poke bowls   Met   Long Term Goal 4: Patient will report avg pain of </=2/10 during functional activities to demonstrate improved QOL. LTG 4 Current Status:: : reports no pain, unless walking long distance 5-6/10   Partially met, Not Met   Long Term Goal 5: Patient will report </=2/45-50 on BRANDON to demonstrate improved subjective functional mobility. LTG 5 Current Status:: :  (10/45 at evaluation ;  at PN)   Not Met     Body Structures, Functions, Activity Limitations Requiring

## 2025-01-31 NOTE — PROGRESS NOTES
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 Met   LTG 4 Patient will report avg pain of </=2/10 during functional activities to demonstrate improved QOL. Partially met, Not Met   LTG 5 Patient will report </=2/45-50 on BRANDON to demonstrate improved subjective functional mobility. Not Met          Plan:  Frequency/Duration:  Plan  Additional Comments: Discharge  Pt to continue current HEP.  See objective section for any therapeutic exercise changes, additions or modifications this date.    Therapy Time:   PT Individual Minutes  Time In: 1030  Time Out: 1120  Minutes: 50  Timed Code Treatment Minutes: 40 Minutes  Procedure Minutes: 10    Timed Activity Minutes Units   Ther Ex 40 3     Electronically signed by Annie Duran, PT on 1/31/25 at 12:28 PM EST

## 2025-04-21 RX ORDER — ROSUVASTATIN CALCIUM 20 MG/1
20 TABLET, COATED ORAL DAILY
Qty: 90 TABLET | Refills: 1 | OUTPATIENT
Start: 2025-04-21

## 2025-04-21 NOTE — TELEPHONE ENCOUNTER
Patient has not been seen in office since 11/2024 and has no scheduled follow ups with our office at this time. Follow up with PCP.

## 2025-06-19 RX ORDER — ROSUVASTATIN CALCIUM 20 MG/1
20 TABLET, COATED ORAL DAILY
Qty: 90 TABLET | Refills: 1 | OUTPATIENT
Start: 2025-06-19

## (undated) DEVICE — LABEL MED MINI W/ MARKER

## (undated) DEVICE — AGENT HEMOSTATIC SURGIFLOW MATRIX KIT W/THROMBIN

## (undated) DEVICE — COVER MICSCP W46XL120IN 4 BINOC GLS LENS LEICA

## (undated) DEVICE — 4-PORT MANIFOLD: Brand: NEPTUNE 2

## (undated) DEVICE — SUTURE ABSORBABLE ANTIBACT 1-0 CT-1 24 IN STRATAFIX PDS + SXPP1A443

## (undated) DEVICE — SUTURE VICRYL SZ 3-0 L18IN ABSRB UD PS-2 L19MM 3/8 CRV PRIM J497H

## (undated) DEVICE — SUTURE VICRYL SZ 0 L27IN ABSRB UD L26MM CP-2 1/2 CIR SGL J870H

## (undated) DEVICE — SUTURE VICRYL SZ 4-0 L18IN ABSRB UD L19MM PS-2 3/8 CIR PRIM J496H

## (undated) DEVICE — FLOSEAL WITH RECOTHROM - 10ML.: Brand: FLOSEAL HEMOSTATIC MATRIX

## (undated) DEVICE — SYRINGE MED 30ML STD CLR PLAS LUERLOCK TIP N CTRL DISP

## (undated) DEVICE — SUTURE VICRYL + SZ 2-0 L27IN ABSRB UD CP-1 1/2 CIR REV CUT VCP266H

## (undated) DEVICE — DRAIN SURG 10FR 100% SIL RND END PERF W/ TRCR

## (undated) DEVICE — GLOVE ORANGE PI 8   MSG9080

## (undated) DEVICE — KAIRISON TUBING SET PNEUMATIC, (3000 MM), STERILE, DISPOSABLE, TO BE USED WITH: FK898R, PACKAGE OF 10 PIECES: Brand: KAIRISON

## (undated) DEVICE — SPONGE DRN W4XL4IN RAYON/POLYESTER 6 PLY NONWOVEN PRECUT 2 PER PK

## (undated) DEVICE — KIT EVAC 400CC PVC RADPQ Y CONN

## (undated) DEVICE — 1010 S-DRAPE TOWEL DRAPE 10/BX: Brand: STERI-DRAPE™

## (undated) DEVICE — GLOVE ORANGE PI 7 1/2   MSG9075

## (undated) DEVICE — CARBIDE MATCH HEAD

## (undated) DEVICE — LIQUIBAND RAPID ADHESIVE 36/CS 0.8ML: Brand: MEDLINE

## (undated) DEVICE — NEURO: Brand: MEDLINE INDUSTRIES, INC.